# Patient Record
Sex: FEMALE | Race: OTHER | HISPANIC OR LATINO | Employment: FULL TIME | URBAN - METROPOLITAN AREA
[De-identification: names, ages, dates, MRNs, and addresses within clinical notes are randomized per-mention and may not be internally consistent; named-entity substitution may affect disease eponyms.]

---

## 2018-03-23 ENCOUNTER — OFFICE VISIT (OUTPATIENT)
Dept: OBGYN CLINIC | Facility: CLINIC | Age: 38
End: 2018-03-23
Payer: COMMERCIAL

## 2018-03-23 VITALS
HEIGHT: 65 IN | BODY MASS INDEX: 27.66 KG/M2 | WEIGHT: 166 LBS | SYSTOLIC BLOOD PRESSURE: 128 MMHG | DIASTOLIC BLOOD PRESSURE: 84 MMHG

## 2018-03-23 DIAGNOSIS — E04.9 ENLARGED THYROID: ICD-10-CM

## 2018-03-23 DIAGNOSIS — Z01.419 PAPANICOLAOU SMEAR, AS PART OF ROUTINE GYNECOLOGICAL EXAMINATION: Primary | ICD-10-CM

## 2018-03-23 DIAGNOSIS — Z11.3 SCREENING EXAMINATION FOR VENEREAL DISEASE: ICD-10-CM

## 2018-03-23 PROCEDURE — S0610 ANNUAL GYNECOLOGICAL EXAMINA: HCPCS | Performed by: PHYSICIAN ASSISTANT

## 2018-03-23 RX ORDER — IBUPROFEN 200 MG
TABLET ORAL EVERY 6 HOURS PRN
COMMUNITY
End: 2020-07-15

## 2018-03-23 NOTE — ASSESSMENT & PLAN NOTE
Pap guidelines reviewed  Pap & HPV done today  STD testing done today per patient request  Script for HIV, Hep B & C, RPR given  Return to office for annual or as needed

## 2018-03-23 NOTE — PROGRESS NOTES
Assessment/Plan   Enlarged thyroid  Reviewed with patient enlarged thyroid on exam today  Will have thyroid ultrasound and blood work done  Papanicolaou smear, as part of routine gynecological examination  Pap guidelines reviewed  Pap & HPV done today  STD testing done today per patient request  Script for HIV, Hep B & C, RPR given  Return to office for annual or as needed  Subjective:     Patient ID: Joaquina Hector is a 42 y o  y o  female  HPI  41 yo seen for annual exam   Hx of ablation a few years ago  Occ spotting since but nothing regular  Denies bowel or bladder issues  Patient currently has 2 partners, would like STD testing done  Denies abuse  Last pap: unknown, reports always normal    The following portions of the patient's history were reviewed and updated as appropriate:   She  has a past medical history of Lump of breast, right  She   Patient Active Problem List    Diagnosis Date Noted    Papanicolaou smear, as part of routine gynecological examination 2018    Enlarged thyroid 2018     She  has a past surgical history that includes Appendectomy; Breast lumpectomy (Right, ); Endometrial ablation (2008); and Tubal ligation ()  Her family history includes Diabetes in her father and mother; Hypertension in her brother, father, and mother; Prostate cancer in her father  She  reports that she has never smoked  She has never used smokeless tobacco  She reports that she drinks alcohol  She reports that she uses drugs, including Marijuana  Current Outpatient Prescriptions   Medication Sig Dispense Refill    ibuprofen (MOTRIN) 200 mg tablet Take by mouth every 6 (six) hours as needed for mild pain       No current facility-administered medications for this visit  She has No Known Allergies       Menstrual History:  OB History      Para Term  AB Living    5 3 3   2 3    SAB TAB Ectopic Multiple Live Births    3 1     1         Menarche age: 4 Review of Systems   Constitutional: Negative for fatigue, fever and unexpected weight change  HENT: Negative for dental problem and sinus pressure  Eyes: Negative for visual disturbance  Respiratory: Negative for cough, shortness of breath and wheezing  Cardiovascular: Negative for chest pain  Gastrointestinal: Negative for abdominal pain, blood in stool, constipation, diarrhea, nausea and vomiting  Endocrine: Negative for polydipsia  Genitourinary: Negative for difficulty urinating, dyspareunia, dysuria, frequency, hematuria, pelvic pain and urgency  Musculoskeletal: Negative for arthralgias and back pain  Neurological: Negative for dizziness, seizures, light-headedness and headaches  Psychiatric/Behavioral: Negative for suicidal ideas  The patient is not nervous/anxious  Objective:     Physical Exam   Constitutional: She is oriented to person, place, and time  She appears well-developed and well-nourished  Genitourinary: Vagina normal and uterus normal  There is no rash, tenderness, lesion, injury or Bartholin's cyst on the right labia  There is no rash, tenderness, lesion, injury or Bartholin's cyst on the left labia  Vagina exhibits no lesion  No erythema, tenderness or bleeding in the vagina  No signs of injury around the vagina  No vaginal discharge found  Right adnexum does not display mass, does not display tenderness and does not display fullness  Left adnexum does not display mass, does not display tenderness and does not display fullness  Cervix does not exhibit motion tenderness, lesion or discharge  Uterus is not enlarged, tender, exhibiting a mass, irregular (is regular) or mobile  HENT:   Head: Normocephalic and atraumatic  Neck: Thyromegaly (diffuse) present  Cardiovascular: Normal rate, regular rhythm and normal heart sounds  Exam reveals no gallop and no friction rub  No murmur heard    Pulmonary/Chest: Effort normal and breath sounds normal  No respiratory distress  She has no wheezes  Right breast exhibits no inverted nipple, no mass, no nipple discharge, no skin change and no tenderness  Left breast exhibits no inverted nipple, no mass, no nipple discharge, no skin change and no tenderness  Breasts are symmetrical  There is no breast swelling  Abdominal: Soft  She exhibits no distension and no mass  There is no tenderness  There is no rebound and no guarding  No hernia  Lymphadenopathy:     She has no cervical adenopathy  Right: No inguinal adenopathy present  Left: No inguinal adenopathy present  Neurological: She is alert and oriented to person, place, and time  Skin: Skin is warm and dry  Psychiatric: She has a normal mood and affect   Her behavior is normal

## 2018-03-23 NOTE — ASSESSMENT & PLAN NOTE
Reviewed with patient enlarged thyroid on exam today  Will have thyroid ultrasound and blood work done

## 2018-03-28 LAB
DEPRECATED C TRACH RRNA XXX QL PRB: NOT DETECTED
HPV HR 12 DNA CVX QL NAA+PROBE: NOT DETECTED
HPV16 DNA SPEC QL NAA+PROBE: DETECTED
HPV18 DNA SPEC QL NAA+PROBE: NOT DETECTED
N GONORRHOEA DNA UR QL NAA+PROBE: NOT DETECTED
THIN PREP CVX: ABNORMAL

## 2018-03-29 DIAGNOSIS — N76.0 BACTERIAL VAGINOSIS: Primary | ICD-10-CM

## 2018-03-29 DIAGNOSIS — B96.89 BACTERIAL VAGINOSIS: Primary | ICD-10-CM

## 2018-03-29 RX ORDER — METRONIDAZOLE 500 MG/1
500 TABLET ORAL EVERY 12 HOURS SCHEDULED
Qty: 14 TABLET | Refills: 0 | Status: SHIPPED | OUTPATIENT
Start: 2018-03-29 | End: 2018-04-05

## 2018-03-30 LAB
HBV SURFACE AG SERPL QL IA: NORMAL
HCV AB S/CO SERPL IA: 0.01
HCV AB SERPL QL IA: NORMAL
HIV 1+2 AB+HIV1 P24 AG SERPL QL IA: NORMAL
RPR SER QL: NORMAL
T4 FREE SERPL-MCNC: 1.2 NG/DL (ref 0.8–1.8)
TSH SERPL-ACNC: 0.8 MIU/L

## 2018-04-13 ENCOUNTER — PROCEDURE VISIT (OUTPATIENT)
Dept: OBGYN CLINIC | Facility: CLINIC | Age: 38
End: 2018-04-13
Payer: COMMERCIAL

## 2018-04-13 VITALS
WEIGHT: 163 LBS | HEIGHT: 66 IN | DIASTOLIC BLOOD PRESSURE: 82 MMHG | SYSTOLIC BLOOD PRESSURE: 130 MMHG | BODY MASS INDEX: 26.2 KG/M2

## 2018-04-13 DIAGNOSIS — R87.810 ASCUS WITH POSITIVE HIGH RISK HPV CERVICAL: Primary | ICD-10-CM

## 2018-04-13 DIAGNOSIS — R87.610 ASCUS WITH POSITIVE HIGH RISK HPV CERVICAL: Primary | ICD-10-CM

## 2018-04-13 PROCEDURE — 57456 ENDOCERV CURETTAGE W/SCOPE: CPT | Performed by: PHYSICIAN ASSISTANT

## 2018-04-15 PROBLEM — R87.810 ASCUS WITH POSITIVE HIGH RISK HPV CERVICAL: Status: ACTIVE | Noted: 2018-04-15

## 2018-04-15 PROBLEM — R87.610 ASCUS WITH POSITIVE HIGH RISK HPV CERVICAL: Status: ACTIVE | Noted: 2018-04-15

## 2018-04-15 NOTE — PROGRESS NOTES
Assessment/Plan:    ASCUS with positive high risk HPV cervical  No intercourse, tampon use, soaking in bath tub, or swimming for the next 5 days to avoid risk of infection  Call office with excessive bleeding, cramping, fever, or chills  Office will call with results and appropriate follow up  Problem List Items Addressed This Visit     ASCUS with positive high risk HPV cervical - Primary     No intercourse, tampon use, soaking in bath tub, or swimming for the next 5 days to avoid risk of infection  Call office with excessive bleeding, cramping, fever, or chills  Office will call with results and appropriate follow up  Relevant Orders    GP ENDO-CERVICAL BIOPSY            Subjective:      Patient ID: Allen Zheng is a 42 y o  female  HPI  43 yo seen for colposcopy  Pap smear on 3/23/2018 showed ASCUS (+)HRHPV 18  Denies bleeding in between menses or with intercourse  No family hx of GYN cancers  Occasional smoker  The following portions of the patient's history were reviewed and updated as appropriate:   She  has a past medical history of Lump of breast, right  She   Patient Active Problem List    Diagnosis Date Noted    ASCUS with positive high risk HPV cervical 04/15/2018    Papanicolaou smear, as part of routine gynecological examination 03/23/2018    Enlarged thyroid 03/23/2018     She  has a past surgical history that includes Appendectomy; Breast lumpectomy (Right, 1996); Endometrial ablation (08/2008); and Tubal ligation (2007)  Her family history includes Diabetes in her father and mother; Hypertension in her brother, father, and mother; Prostate cancer in her father  She  reports that she has never smoked  She has never used smokeless tobacco  She reports that she drinks alcohol  She reports that she uses drugs, including Marijuana    Current Outpatient Prescriptions   Medication Sig Dispense Refill    ibuprofen (MOTRIN) 200 mg tablet Take by mouth every 6 (six) hours as needed for mild pain       No current facility-administered medications for this visit  She has No Known Allergies       Review of Systems   Constitutional: Negative for fatigue, fever and unexpected weight change  HENT: Negative for dental problem and sinus pressure  Eyes: Negative for visual disturbance  Respiratory: Negative for cough, shortness of breath and wheezing  Cardiovascular: Negative for chest pain  Gastrointestinal: Negative for abdominal pain, blood in stool, constipation, diarrhea, nausea and vomiting  Endocrine: Negative for polydipsia  Genitourinary: Negative for difficulty urinating, dyspareunia, dysuria, frequency, hematuria, pelvic pain and urgency  Musculoskeletal: Negative for arthralgias and back pain  Neurological: Negative for dizziness, seizures, light-headedness and headaches  Psychiatric/Behavioral: Negative for suicidal ideas  The patient is not nervous/anxious  Objective:      /82 (BP Location: Left arm, Patient Position: Sitting, Cuff Size: Standard)   Ht 5' 6" (1 676 m)   Wt 73 9 kg (163 lb)   BMI 26 31 kg/m²          Physical Exam   Constitutional: She is oriented to person, place, and time  She appears well-developed and well-nourished  Genitourinary: There is no rash, tenderness, lesion or injury on the right labia  There is no rash, tenderness, lesion or injury on the left labia  Cervix exhibits no motion tenderness, no discharge and no friability  No erythema, tenderness or bleeding in the vagina  No foreign body in the vagina  No signs of injury around the vagina  No vaginal discharge found  Neurological: She is alert and oriented to person, place, and time  Skin: Skin is warm and dry  No rash noted  No erythema  No pallor       Colposcopy  Date/Time: 4/15/2018 1:43 PM  Performed by: Niecy Zhang  Authorized by: Niecy Zhang     Consent:     Consent obtained:  Verbal and written    Consent given by:  Patient Procedural risks discussed:  Bleeding    Patient questions answered: yes      Patient agrees, verbalizes understanding, and wants to proceed: yes      Educational handouts given: yes      Instructions and paperwork completed: yes    Universal protocol:     Patient states understanding of procedure being performed: yes    Pre-procedure:     Pre-procedure timeout performed: yes      Premeds:  Ibuprofen    Prepped with: acetic acid    Indication:     Indication:  ASC-US ((+)HPV 18)  Procedure:     Procedure: Colposcopy w/ endocervical curettage      Adel speculum was placed in the vagina: yes      Under colposcopic examination the transition zone was seen in entirety: yes      Endocervix was curetted using a Kevorkian curette: yes      Monsel's solution was applied: yes      Biopsy(s): yes      Location:  [facility]  Post-procedure:     Findings: Punctation      Patient tolerance of procedure:   Tolerated well, no immediate complications

## 2018-04-15 NOTE — ASSESSMENT & PLAN NOTE
No intercourse, tampon use, soaking in bath tub, or swimming for the next 5 days to avoid risk of infection  Call office with excessive bleeding, cramping, fever, or chills  Office will call with results and appropriate follow up

## 2018-04-16 LAB — BIOPSY SPEC-IMP: NORMAL

## 2018-09-21 ENCOUNTER — HOSPITAL ENCOUNTER (OUTPATIENT)
Dept: RADIOLOGY | Facility: HOSPITAL | Age: 38
Discharge: HOME | End: 2018-09-21
Payer: COMMERCIAL

## 2018-09-21 ENCOUNTER — TRANSCRIBE ORDERS (OUTPATIENT)
Dept: ADMINISTRATIVE | Facility: HOSPITAL | Age: 38
End: 2018-09-21

## 2018-09-21 DIAGNOSIS — R07.82 INTERCOSTAL PAIN: Primary | ICD-10-CM

## 2018-09-21 DIAGNOSIS — R07.82 INTERCOSTAL PAIN: ICD-10-CM

## 2018-09-21 PROCEDURE — 71101 X-RAY EXAM UNILAT RIBS/CHEST: CPT

## 2019-02-28 ENCOUNTER — TRANSCRIBE ORDERS (OUTPATIENT)
Dept: ADMINISTRATIVE | Facility: HOSPITAL | Age: 39
End: 2019-02-28

## 2019-02-28 DIAGNOSIS — Z12.39 SCREENING BREAST EXAMINATION: Primary | ICD-10-CM

## 2019-02-28 DIAGNOSIS — R01.1 UNDIAGNOSED CARDIAC MURMURS: ICD-10-CM

## 2019-03-06 ENCOUNTER — HOSPITAL ENCOUNTER (OUTPATIENT)
Dept: MAMMOGRAPHY | Facility: HOSPITAL | Age: 39
Discharge: HOME | End: 2019-03-06
Payer: COMMERCIAL

## 2019-03-06 VITALS — HEIGHT: 66 IN | BODY MASS INDEX: 26.2 KG/M2 | WEIGHT: 163 LBS

## 2019-03-06 DIAGNOSIS — Z12.39 SCREENING BREAST EXAMINATION: ICD-10-CM

## 2019-03-06 PROCEDURE — 77067 SCR MAMMO BI INCL CAD: CPT

## 2019-03-06 PROCEDURE — 77063 BREAST TOMOSYNTHESIS BI: CPT

## 2019-03-27 ENCOUNTER — ANNUAL EXAM (OUTPATIENT)
Dept: OBGYN CLINIC | Facility: CLINIC | Age: 39
End: 2019-03-27
Payer: COMMERCIAL

## 2019-03-27 VITALS
SYSTOLIC BLOOD PRESSURE: 128 MMHG | WEIGHT: 163 LBS | DIASTOLIC BLOOD PRESSURE: 82 MMHG | HEIGHT: 66 IN | BODY MASS INDEX: 26.2 KG/M2

## 2019-03-27 DIAGNOSIS — E04.9 ENLARGED THYROID: ICD-10-CM

## 2019-03-27 DIAGNOSIS — Z11.3 SCREENING EXAMINATION FOR VENEREAL DISEASE: ICD-10-CM

## 2019-03-27 DIAGNOSIS — Z01.419 GYNECOLOGIC EXAM NORMAL: Primary | ICD-10-CM

## 2019-03-27 PROCEDURE — S0612 ANNUAL GYNECOLOGICAL EXAMINA: HCPCS | Performed by: PHYSICIAN ASSISTANT

## 2019-03-27 RX ORDER — LISINOPRIL 10 MG/1
10 TABLET ORAL EVERY MORNING
COMMUNITY
Start: 2019-03-25 | End: 2021-09-01

## 2019-03-27 NOTE — PATIENT INSTRUCTIONS
Reviewed avoiding excessive salt and caffeine intake as well as trying Vitamin E, Evening Primrose oil, or Viiolet to help with pain

## 2019-03-27 NOTE — ASSESSMENT & PLAN NOTE
Pap guidelines reviewed  Pap with HPV done today  STD cultures done per patient request  STD blood work given  Reviewed patient normal breast exam today and mammogram in early March 2019  Reviewed avoiding excessive salt and caffeine intake as well as trying Vitamin E, Evening Primrose oil, or Viiolet to help with pain  Script for TFTs given for enlarged thyroid  Return to office for annual or as needed

## 2019-03-27 NOTE — PROGRESS NOTES
Assessment/Plan   Problem List Items Addressed This Visit        Endocrine    Enlarged thyroid    Relevant Orders    TSH, 3rd generation    T4, free       Other    Gynecologic exam normal - Primary     Pap guidelines reviewed  Pap with HPV done today  STD cultures done per patient request  STD blood work given  Reviewed patient normal breast exam today and mammogram in early March 2019  Reviewed avoiding excessive salt and caffeine intake as well as trying Vitamin E, Evening Primrose oil, or Viiolet to help with pain  Script for TFTs given for enlarged thyroid  Return to office for annual or as needed  Relevant Orders    GP Liquid-Based Pap + HPV Plus      Other Visit Diagnoses     Screening examination for venereal disease        Relevant Orders    GP Chlamydia + Gonorrhea, Liquid-Based    Hepatitis B surface antigen    Hepatitis C antibody    HIV 1/2 AG-AB combo    RPR          Subjective:     Patient ID: Sahil White is a 34 y o  y o  female  HPI  35 yo seen for annual exam  Hx of uterine ablation, does not have much bleeding since, occ spotting  Denies bladder issues  Reports occasional constipation has started taking Metamucil daily  Patient reports having breast pain on left side, typically when she lays on that side at night  PCP suggested watching caffeine in diet and has not noticed huge difference  Does not notice that it is cyclical at all  Denies breast lumps, fever, chills, nipple discharge, or trauma to breast  Mammogram on 3/6/2019 BIRADS 1: Negative  Patient was shown to have enlarged thyroid on exam last year, did not have a chance to get the blood work done, needs another script  Reports PCP heard new heart murmur at last exam, going for ECHO  Last pap: 3/23/2019 ASCUS (+)HRHPV non 16, 18  Colpo: 4/2018 benign [facility]     The following portions of the patient's history were reviewed and updated as appropriate:   She  has a past medical history of Hypertension (11/16/2018) and Lump of breast, right  She   Patient Active Problem List    Diagnosis Date Noted    Gynecologic exam normal 2019    ASCUS with positive high risk HPV cervical 04/15/2018    Papanicolaou smear, as part of routine gynecological examination 2018    Enlarged thyroid 2018     She  has a past surgical history that includes Appendectomy; Breast lumpectomy (Right, ); Endometrial ablation (2008); Tubal ligation (); and Breast biopsy (Right, )  Her family history includes Cancer (age of onset: 76) in her paternal aunt; Colon cancer in her father; Diabetes in her father and mother; Hypertension in her brother, father, and mother; Prostate cancer (age of onset: 79) in her father  She  reports that she has never smoked  She has never used smokeless tobacco  She reports that she drinks alcohol  She reports that she has current or past drug history  Drug: Marijuana  Current Outpatient Medications   Medication Sig Dispense Refill    ibuprofen (MOTRIN) 200 mg tablet Take by mouth every 6 (six) hours as needed for mild pain      lisinopril (ZESTRIL) 10 mg tablet        No current facility-administered medications for this visit  She has No Known Allergies       Menstrual History:  OB History        5    Para   3    Term   3            AB   2    Living   3       SAB   1    TAB   1    Ectopic        Multiple        Live Births   3                Menarche age: 4  No LMP recorded  Patient has had an ablation  Review of Systems   Constitutional: Negative for fatigue, fever and unexpected weight change  HENT: Negative for dental problem and sinus pressure  Eyes: Negative for visual disturbance  Respiratory: Negative for cough, shortness of breath and wheezing  Cardiovascular: Negative for chest pain  Gastrointestinal: Negative for abdominal pain, blood in stool, constipation, diarrhea, nausea and vomiting  Endocrine: Negative for polydipsia  Genitourinary: Negative for difficulty urinating, dyspareunia, dysuria, frequency, hematuria, pelvic pain and urgency  Musculoskeletal: Negative for arthralgias and back pain  Neurological: Negative for dizziness, seizures, light-headedness and headaches  Psychiatric/Behavioral: Negative for suicidal ideas  The patient is not nervous/anxious  Objective:  Vitals:    03/27/19 0803   BP: 128/82   BP Location: Left arm   Patient Position: Sitting   Cuff Size: Standard   Weight: 73 9 kg (163 lb)   Height: 5' 6" (1 676 m)      Physical Exam   Constitutional: She is oriented to person, place, and time  She appears well-developed and well-nourished  Genitourinary: Rectum normal, vagina normal and uterus normal  There is no rash, tenderness, lesion, injury or Bartholin's cyst on the right labia  There is no rash, tenderness, lesion, injury or Bartholin's cyst on the left labia  Vagina exhibits no lesion  No erythema, tenderness or bleeding in the vagina  No signs of injury around the vagina  No vaginal discharge found  Right adnexum does not display mass, does not display tenderness and does not display fullness  Left adnexum does not display mass, does not display tenderness and does not display fullness  Cervix does not exhibit motion tenderness, lesion or discharge  Uterus is not enlarged, tender, exhibiting a mass, irregular (is regular) or mobile  Rectal exam shows no external hemorrhoid, no internal hemorrhoid, no fissure, no mass, no tenderness, anal tone normal and guaiac negative stool  HENT:   Head: Normocephalic and atraumatic  Neck: Thyromegaly (diffuse) present  Cardiovascular: Normal rate, regular rhythm and normal heart sounds  Exam reveals no gallop and no friction rub  No murmur heard  Pulmonary/Chest: Effort normal and breath sounds normal  No respiratory distress  She has no wheezes   Right breast exhibits no inverted nipple, no mass, no nipple discharge, no skin change and no tenderness  Left breast exhibits no inverted nipple, no mass, no nipple discharge, no skin change and no tenderness  No breast swelling, tenderness, discharge or bleeding  Breasts are symmetrical    Abdominal: Soft  She exhibits no distension and no mass  There is no tenderness  There is no rebound and no guarding  No hernia  Lymphadenopathy:     She has no cervical adenopathy  Right: No inguinal adenopathy present  Left: No inguinal adenopathy present  Neurological: She is alert and oriented to person, place, and time  Skin: Skin is warm and dry  Psychiatric: She has a normal mood and affect   Her behavior is normal

## 2019-03-28 ENCOUNTER — HOSPITAL ENCOUNTER (OUTPATIENT)
Dept: NON INVASIVE DIAGNOSTICS | Facility: CLINIC | Age: 39
Discharge: HOME | End: 2019-03-28
Payer: COMMERCIAL

## 2019-03-28 DIAGNOSIS — R01.1 UNDIAGNOSED CARDIAC MURMURS: ICD-10-CM

## 2019-03-28 PROCEDURE — 93306 TTE W/DOPPLER COMPLETE: CPT

## 2019-03-28 PROCEDURE — 93306 TTE W/DOPPLER COMPLETE: CPT | Performed by: INTERNAL MEDICINE

## 2019-04-01 LAB
HBV SURFACE AG SERPL QL IA: NORMAL
HCV AB S/CO SERPL IA: 0.03
HCV AB SERPL QL IA: NORMAL
HIV 1+2 AB+HIV1 P24 AG SERPL QL IA: NORMAL
RPR SER QL: NORMAL
T4 FREE SERPL-MCNC: 1.1 NG/DL (ref 0.8–1.8)
TSH SERPL-ACNC: 0.49 MIU/L

## 2019-04-02 ENCOUNTER — TELEPHONE (OUTPATIENT)
Dept: OBGYN CLINIC | Facility: CLINIC | Age: 39
End: 2019-04-02

## 2019-04-04 DIAGNOSIS — N76.0 BACTERIAL VAGINOSIS: Primary | ICD-10-CM

## 2019-04-04 DIAGNOSIS — B96.89 BACTERIAL VAGINOSIS: Primary | ICD-10-CM

## 2019-04-04 RX ORDER — METRONIDAZOLE 500 MG/1
500 TABLET ORAL EVERY 8 HOURS SCHEDULED
Qty: 21 TABLET | Refills: 0 | Status: SHIPPED | OUTPATIENT
Start: 2019-04-04 | End: 2019-04-11

## 2019-04-16 ENCOUNTER — TELEPHONE (OUTPATIENT)
Dept: OBGYN CLINIC | Facility: CLINIC | Age: 39
End: 2019-04-16

## 2019-05-03 ENCOUNTER — PROCEDURE VISIT (OUTPATIENT)
Dept: OBGYN CLINIC | Facility: CLINIC | Age: 39
End: 2019-05-03
Payer: COMMERCIAL

## 2019-05-03 VITALS
WEIGHT: 162 LBS | HEIGHT: 66 IN | SYSTOLIC BLOOD PRESSURE: 120 MMHG | DIASTOLIC BLOOD PRESSURE: 80 MMHG | BODY MASS INDEX: 26.03 KG/M2

## 2019-05-03 DIAGNOSIS — R87.810 ASCUS WITH POSITIVE HIGH RISK HPV CERVICAL: Primary | ICD-10-CM

## 2019-05-03 DIAGNOSIS — R87.610 ASCUS WITH POSITIVE HIGH RISK HPV CERVICAL: Primary | ICD-10-CM

## 2019-05-03 PROCEDURE — 57454 BX/CURETT OF CERVIX W/SCOPE: CPT | Performed by: PHYSICIAN ASSISTANT

## 2019-05-07 LAB
BIOPSY SPEC-IMP: NORMAL
OTHER STN CVX: ABNORMAL

## 2019-05-08 LAB
BIOPSY SPEC-IMP: NORMAL
BIOPSY SPEC-IMP: NORMAL

## 2020-06-15 ENCOUNTER — TRANSCRIBE ORDERS (OUTPATIENT)
Dept: ADMINISTRATIVE | Facility: HOSPITAL | Age: 40
End: 2020-06-15

## 2020-06-15 DIAGNOSIS — Z12.31 SCREENING MAMMOGRAM FOR HIGH-RISK PATIENT: Primary | ICD-10-CM

## 2020-06-15 DIAGNOSIS — L72.0 EPIDERMAL CYST: Primary | ICD-10-CM

## 2020-06-17 ENCOUNTER — HOSPITAL ENCOUNTER (OUTPATIENT)
Dept: MAMMOGRAPHY | Facility: HOSPITAL | Age: 40
Discharge: HOME | End: 2020-06-17
Payer: COMMERCIAL

## 2020-06-17 VITALS — WEIGHT: 162 LBS | HEIGHT: 66 IN | BODY MASS INDEX: 26.03 KG/M2

## 2020-06-17 DIAGNOSIS — Z12.31 SCREENING MAMMOGRAM FOR HIGH-RISK PATIENT: ICD-10-CM

## 2020-06-17 PROCEDURE — 77067 SCR MAMMO BI INCL CAD: CPT

## 2020-06-17 PROCEDURE — 77063 BREAST TOMOSYNTHESIS BI: CPT

## 2020-06-19 ENCOUNTER — HOSPITAL ENCOUNTER (OUTPATIENT)
Dept: ULTRASOUND IMAGING | Facility: HOSPITAL | Age: 40
Discharge: HOME | End: 2020-06-19
Payer: COMMERCIAL

## 2020-06-19 DIAGNOSIS — L72.0 EPIDERMAL CYST: ICD-10-CM

## 2020-06-19 PROCEDURE — 76536 US EXAM OF HEAD AND NECK: CPT

## 2020-07-01 ENCOUNTER — ANNUAL EXAM (OUTPATIENT)
Dept: OBGYN CLINIC | Facility: CLINIC | Age: 40
End: 2020-07-01
Payer: COMMERCIAL

## 2020-07-01 VITALS
SYSTOLIC BLOOD PRESSURE: 118 MMHG | BODY MASS INDEX: 26.49 KG/M2 | HEIGHT: 65 IN | DIASTOLIC BLOOD PRESSURE: 78 MMHG | TEMPERATURE: 96.6 F | WEIGHT: 159 LBS

## 2020-07-01 DIAGNOSIS — Z12.31 ENCOUNTER FOR SCREENING MAMMOGRAM FOR MALIGNANT NEOPLASM OF BREAST: ICD-10-CM

## 2020-07-01 DIAGNOSIS — Z11.3 SCREENING EXAMINATION FOR VENEREAL DISEASE: ICD-10-CM

## 2020-07-01 DIAGNOSIS — Z01.419 ROUTINE GYNECOLOGICAL EXAMINATION: Primary | ICD-10-CM

## 2020-07-01 PROCEDURE — 87491 CHLMYD TRACH DNA AMP PROBE: CPT | Performed by: PHYSICIAN ASSISTANT

## 2020-07-01 PROCEDURE — 87624 HPV HI-RISK TYP POOLED RSLT: CPT | Performed by: PHYSICIAN ASSISTANT

## 2020-07-01 PROCEDURE — G0145 SCR C/V CYTO,THINLAYER,RESCR: HCPCS | Performed by: PHYSICIAN ASSISTANT

## 2020-07-01 PROCEDURE — 87591 N.GONORRHOEAE DNA AMP PROB: CPT | Performed by: PHYSICIAN ASSISTANT

## 2020-07-01 PROCEDURE — S0612 ANNUAL GYNECOLOGICAL EXAMINA: HCPCS | Performed by: PHYSICIAN ASSISTANT

## 2020-07-01 RX ORDER — YOHIMBE BARK 500 MG
CAPSULE ORAL EVERY MORNING
COMMUNITY

## 2020-07-01 NOTE — ASSESSMENT & PLAN NOTE
Pap guidelines reviewed  Pap with HPV done today  STD cultures done today  Script for mammogram given  Return to office for annual or as needed

## 2020-07-01 NOTE — PROGRESS NOTES
Assessment/Plan   Problem List Items Addressed This Visit        Other    Routine gynecological examination - Primary     Pap guidelines reviewed  Pap with HPV done today  STD cultures done today  Script for mammogram given  Return to office for annual or as needed  Relevant Orders    Liquid-based pap, screening    Chlamydia/GC amplified DNA by PCR      Other Visit Diagnoses     Encounter for screening mammogram for malignant neoplasm of breast        Relevant Orders    Mammo screening bilateral w 3d & cad    Screening examination for venereal disease        Relevant Orders    Chlamydia/GC amplified DNA by PCR          Subjective:     Patient ID: Kitty Blas is a 37 y o  y o  female  HPI  36 yo seen for annual exam  Hx of endometrial ablation in 2008  LMP 6 months ago, 1 day of spotting at that time  Denies bowel or bladder issues  Last pap: 3/27/2019 ASCUS (+)HPV 16  Colpo 5/2019 [facility] benign  Last mammogram: 6/17/2020 BIRADS 1: negative  The following portions of the patient's history were reviewed and updated as appropriate:   She  has a past medical history of Hypertension (11/16/2018), Lump of breast, right, and Lupus ([facility] [address] )  She   Patient Active Problem List    Diagnosis Date Noted    Routine gynecological examination 07/01/2020    Gynecologic exam normal 03/27/2019    ASCUS with positive high risk HPV cervical 04/15/2018    Papanicolaou smear, as part of routine gynecological examination 03/23/2018    Enlarged thyroid 03/23/2018     She  has a past surgical history that includes Appendectomy; Breast lumpectomy (Right, 1996); Endometrial ablation (08/2008); Tubal ligation (2007); and Breast biopsy (Right, 1995)  Her family history includes Cancer (age of onset: 76) in her paternal aunt;  Colon cancer in her father; Diabetes in her father and mother; Hypertension in her brother, father, and mother; No Known Problems in her brother, daughter, daughter, sister, sister, sister, sister, sister, and son; Prostate cancer (age of onset: 79) in her father  She  reports that she has never smoked  She has never used smokeless tobacco  She reports that she drinks alcohol  She reports that she has current or past drug history  Drug: Marijuana  Current Outpatient Medications   Medication Sig Dispense Refill    ibuprofen (MOTRIN) 200 mg tablet Take by mouth every 6 (six) hours as needed for mild pain      Lactobacillus (ACIDOPHILUS) 100 MG CAPS Take by mouth      lisinopril (ZESTRIL) 10 mg tablet       vitamin E, tocopherol, 200 units capsule Take 200 Units by mouth daily       No current facility-administered medications for this visit  She has No Known Allergies       Menstrual History:  OB History        5    Para   3    Term   3            AB   2    Living   3       SAB   1    TAB   1    Ectopic        Multiple        Live Births   3                  No LMP recorded  Patient has had an ablation  Review of Systems   Constitutional: Negative for fatigue, fever and unexpected weight change  HENT: Negative for dental problem and sinus pressure  Eyes: Negative for visual disturbance  Respiratory: Negative for cough, shortness of breath and wheezing  Cardiovascular: Negative for chest pain  Gastrointestinal: Negative for abdominal pain, blood in stool, constipation, diarrhea, nausea and vomiting  Endocrine: Negative for polydipsia  Genitourinary: Negative for difficulty urinating, dyspareunia, dysuria, frequency, hematuria, pelvic pain and urgency  Musculoskeletal: Negative for arthralgias and back pain  Neurological: Negative for dizziness, seizures, light-headedness and headaches  Psychiatric/Behavioral: Negative for suicidal ideas  The patient is not nervous/anxious          Objective:  Vitals:    20 1133   BP: 118/78   BP Location: Left arm   Patient Position: Sitting   Cuff Size: Standard   Temp: (!) 96 6 °F (35 9 °C)   Weight: 72 1 kg (159 lb)   Height: 5' 5" (1 651 m)      Physical Exam   Constitutional: She is oriented to person, place, and time  She appears well-developed and well-nourished  Genitourinary: Rectum normal, vagina normal and uterus normal  Pelvic exam was performed with patient supine  There is no rash, tenderness, lesion, injury or Bartholin's cyst on the right labia  There is no rash, tenderness, lesion, injury or Bartholin's cyst on the left labia  Vagina exhibits no lesion  No erythema, tenderness or bleeding in the vagina  No signs of injury around the vagina  No vaginal discharge found  Right adnexum does not display mass, does not display tenderness and does not display fullness  Left adnexum does not display mass, does not display tenderness and does not display fullness  Cervix does not exhibit motion tenderness, lesion or discharge  Uterus is not enlarged, tender, exhibiting a mass, irregular (is regular) or mobile  Rectal exam shows no external hemorrhoid, no internal hemorrhoid, no fissure, no mass, no tenderness, anal tone normal and guaiac negative stool  HENT:   Head: Normocephalic and atraumatic  Neck: No thyromegaly present  Cardiovascular: Normal rate, regular rhythm and normal heart sounds  Exam reveals no gallop and no friction rub  No murmur heard  Pulmonary/Chest: Effort normal and breath sounds normal  No respiratory distress  She has no wheezes  Right breast exhibits no inverted nipple, no mass, no nipple discharge, no skin change and no tenderness  Left breast exhibits no inverted nipple, no mass, no nipple discharge, no skin change and no tenderness  No breast swelling, tenderness, discharge or bleeding  Breasts are symmetrical    Abdominal: Soft  She exhibits no distension and no mass  There is no tenderness  There is no rebound and no guarding  No hernia  Lymphadenopathy:     She has no cervical adenopathy  Right: No inguinal adenopathy present          Left: No inguinal adenopathy present  Neurological: She is alert and oriented to person, place, and time  Skin: Skin is warm and dry  Psychiatric: She has a normal mood and affect   Her behavior is normal

## 2020-07-02 LAB
C TRACH DNA SPEC QL NAA+PROBE: NEGATIVE
N GONORRHOEA DNA SPEC QL NAA+PROBE: NEGATIVE

## 2020-07-03 LAB
HPV HR 12 DNA CVX QL NAA+PROBE: NEGATIVE
HPV16 DNA CVX QL NAA+PROBE: NEGATIVE
HPV18 DNA CVX QL NAA+PROBE: NEGATIVE

## 2020-07-08 LAB
LAB AP GYN PRIMARY INTERPRETATION: NORMAL
LAB AP LMP: NORMAL
Lab: NORMAL
PATH INTERP SPEC-IMP: NORMAL

## 2020-07-10 ENCOUNTER — OFFICE VISIT (OUTPATIENT)
Dept: SURGERY | Facility: CLINIC | Age: 40
End: 2020-07-10
Payer: COMMERCIAL

## 2020-07-10 VITALS
HEIGHT: 65 IN | TEMPERATURE: 96.8 F | HEART RATE: 51 BPM | SYSTOLIC BLOOD PRESSURE: 112 MMHG | BODY MASS INDEX: 26.49 KG/M2 | DIASTOLIC BLOOD PRESSURE: 60 MMHG | RESPIRATION RATE: 16 BRPM | WEIGHT: 159 LBS

## 2020-07-10 DIAGNOSIS — R22.0 MASS OF SCALP: ICD-10-CM

## 2020-07-10 DIAGNOSIS — R22.0 MASS OF SCALP: Primary | ICD-10-CM

## 2020-07-10 PROCEDURE — U0003 INFECTIOUS AGENT DETECTION BY NUCLEIC ACID (DNA OR RNA); SEVERE ACUTE RESPIRATORY SYNDROME CORONAVIRUS 2 (SARS-COV-2) (CORONAVIRUS DISEASE [COVID-19]), AMPLIFIED PROBE TECHNIQUE, MAKING USE OF HIGH THROUGHPUT TECHNOLOGIES AS DESCRIBED BY CMS-2020-01-R: HCPCS

## 2020-07-10 PROCEDURE — 99242 OFF/OP CONSLTJ NEW/EST SF 20: CPT | Performed by: SURGERY

## 2020-07-10 RX ORDER — ACETAMINOPHEN 500 MG
500 TABLET ORAL EVERY 6 HOURS PRN
COMMUNITY

## 2020-07-10 NOTE — PATIENT INSTRUCTIONS
Excision is the only treatment available here    This has been explained to her and scheduled for next week

## 2020-07-10 NOTE — PROGRESS NOTES
Assessment/Plan:    No problem-specific Assessment & Plan notes found for this encounter  There are no diagnoses linked to this encounter  Subjective:      Patient ID: Tanner Bui is a 37 y o  female  The patient is a 49-year-old black female with a six-month history of a lesion on the top/back of her scalp  This was worked up with a ultrasound which showed a 7 mm nodule  They thought it was most likely a lymph node      The following portions of the patient's history were reviewed and updated as appropriate: allergies, current medications, past family history, past medical history, past social history, past surgical history and problem list     Review of Systems   Constitutional: Negative  HENT: Negative  Hearing loss: minimal     Eyes: Visual disturbance: 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  Respiratory: Positive for shortness of breath  Cardiovascular:        Hypertension  Irregular heart beat, was worked up   Gastrointestinal: Negative  Endocrine: Negative  Genitourinary: Negative  Musculoskeletal: Positive for myalgias  Neck pain: positive for lupus  Lupus   Skin: Negative  Neurological: Positive for headaches  Psychiatric/Behavioral:        Anxiety         Objective:      /60 (BP Location: Left arm, Patient Position: Sitting, Cuff Size: Adult)   Pulse (!) 51   Temp (!) 96 8 °F (36 °C) (Tympanic)   Resp 16   Ht 5' 5" (1 651 m)   Wt 72 1 kg (159 lb)   BMI 26 46 kg/m²          Physical Exam   Constitutional: She is oriented to person, place, and time  She appears well-developed  HENT:   Head: Normocephalic  Right Ear: External ear normal    Left Ear: External ear normal    Eyes:   She has a stellate scar in her conjunctiva on the left from an old injury   Neck: Normal range of motion  No thyromegaly present  Cardiovascular: Normal rate, regular rhythm and normal heart sounds  Pulmonary/Chest: Breath sounds normal  No stridor   No respiratory distress  She has no wheezes  She has no rales  Musculoskeletal: Normal range of motion  Lymphadenopathy:     She has no cervical adenopathy  Neurological: She is alert and oriented to person, place, and time  Skin:   She has a slightly less than 1 cm lesion approximately the crown of her scalp  This is nontender and nonfluctuant  I think it is attached to skin although it is possible it is not

## 2020-07-10 NOTE — H&P
Assessment/Plan:    No problem-specific Assessment & Plan notes found for this encounter  There are no diagnoses linked to this encounter  Subjective:      Patient ID: Rosa Maria Beckford is a 37 y o  female  The patient is a 24-year-old black female with a six-month history of a lesion on the top/back of her scalp  This was worked up with a ultrasound which showed a 7 mm nodule  They thought it was most likely a lymph node      The following portions of the patient's history were reviewed and updated as appropriate: allergies, current medications, past family history, past medical history, past social history, past surgical history and problem list     Review of Systems   Constitutional: Negative  HENT: Negative  Hearing loss: minimal     Eyes: Visual disturbance: 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  Respiratory: Positive for shortness of breath  Cardiovascular:        Hypertension  Irregular heart beat, was worked up   Gastrointestinal: Negative  Endocrine: Negative  Genitourinary: Negative  Musculoskeletal: Positive for myalgias  Neck pain: positive for lupus  Lupus   Skin: Negative  Neurological: Positive for headaches  Psychiatric/Behavioral:        Anxiety         Objective:      /60 (BP Location: Left arm, Patient Position: Sitting, Cuff Size: Adult)   Pulse (!) 51   Temp (!) 96 8 °F (36 °C) (Tympanic)   Resp 16   Ht 5' 5" (1 651 m)   Wt 72 1 kg (159 lb)   BMI 26 46 kg/m²           Physical Exam   Constitutional: She is oriented to person, place, and time  She appears well-developed  HENT:   Head: Normocephalic  Right Ear: External ear normal    Left Ear: External ear normal    Eyes:   She has a stellate scar in her conjunctiva on the left from an old injury   Neck: Normal range of motion  No thyromegaly present  Cardiovascular: Normal rate, regular rhythm and normal heart sounds  Pulmonary/Chest: Breath sounds normal  No stridor   No respiratory distress  She has no wheezes  She has no rales  Musculoskeletal: Normal range of motion  Lymphadenopathy:     She has no cervical adenopathy  Neurological: She is alert and oriented to person, place, and time  Skin:   She has a slightly less than 1 cm lesion approximately the crown of her scalp  This is nontender and nonfluctuant  I think it is attached to skin although it is possible it is not

## 2020-07-11 LAB
INPATIENT: NORMAL
SARS-COV-2 RNA SPEC QL NAA+PROBE: NOT DETECTED

## 2020-07-15 NOTE — PRE-PROCEDURE INSTRUCTIONS
Pre-Surgery Instructions:   Medication Instructions    acetaminophen (TYLENOL) 500 mg tablet Instructed patient per Anesthesia Guidelines   Lactobacillus (ACIDOPHILUS) 100 MG CAPS Instructed patient per Anesthesia Guidelines   lisinopril (ZESTRIL) 10 mg tablet Instructed patient per Anesthesia Guidelines   NON FORMULARY Instructed patient per Anesthesia Guidelines   Sennosides (SENOKOT PO) Instructed patient per Anesthesia Guidelines   VITAMIN E PO Instructed patient per Anesthesia Guidelines  Pre op instructions given  Pt to take lisinopril the am of the procedure   araceli Quintero [phone] Surgical Experience    The following information was developed to assist you to prepare for your operation  What do I need to do before coming to the hospital?   Arrange for a responsible person to drive you to and from the hospital    Arrange care for your children at home  Children are not allowed in the recovery areas of the hospital   Plan to wear clothing that is easy to put on and take off  If you are having shoulder surgery, wear a shirt that buttons or zippers in the front  Bathing  o Shower the evening before and the morning of your surgery with an antibacterial soap  Please refer to the Pre Op Showering Instructions for Surgery Patients Sheet   o Remove nail polish and all body piercing jewelry  o Do not shave any body part for at least 24 hours before surgery-this includes face, arms, legs and upper body  Food  o Nothing to eat or drink after midnight the night before your surgery   This includes candy and chewing gum  o Exception: If your surgery is after 12:00pm (noon), you may have clear liquids such as 7-Up®, ginger ale, apple or cranberry juice, Jell-O®, water, or clear broth until 8:00 am  o Do not drink milk or juice with pulp on the morning before surgery  o Do not drink alcohol 24 hours before surgery  Medicine  o Follow instructions you received from your surgeon about which medicines you may take on the day of surgery  o If instructed to take medicine on the morning of surgery, take pills with just a small sip of water  Call your prescribing doctor for specific infroamtion on what to do if you take insulin    What should I bring to the hospital?    Bring:  Paige Barthel or a walker, if you have them, for foot or knee surgery   A list of the daily medicines, vitamins, minerals, herbals and nutritional supplements you take  Include the dosages of medicines and the time you take them each day   Glasses, dentures or hearing aids   Minimal clothing; you will be wearing hospital sleepwear   Photo ID; required to verify your identity   If you have a Living Will or Power of , bring a copy of the documents   If you have an ostomy, bring an extra pouch and any supplies you use    Do not bring   Medicines or inhalers   Money, valuables or jewelry    What other information should I know about the day of surgery?  Notify your surgeons if you develop a cold, sore throat, cough, fever, rash or any other illness   Report to the Ambulatory Surgical/Same Day Surgery Unit   You will be instructed to stop at Registration only if you have not been pre-registered   Inform your  fi they do not stay that they will be asked by the staff to leave a phone number where they can be reached   Be available to be reached before surgery  In the event the operating room schedule changes, you may be asked to come in earlier or later than expected    *It is important to tell your doctor and others involved in your health care if you are taking or have been taking any non-prescription drugs, vitamins, minerals, herbals or other nutritional supplements   Any of these may interact with some food or medicines and cause a reaction

## 2020-07-16 ENCOUNTER — HOSPITAL ENCOUNTER (OUTPATIENT)
Facility: HOSPITAL | Age: 40
Setting detail: OUTPATIENT SURGERY
Discharge: HOME | End: 2020-07-16
Attending: SURGERY | Admitting: SURGERY
Payer: COMMERCIAL

## 2020-07-16 VITALS
BODY MASS INDEX: 25.7 KG/M2 | HEIGHT: 65 IN | WEIGHT: 154.25 LBS | SYSTOLIC BLOOD PRESSURE: 128 MMHG | HEART RATE: 83 BPM | TEMPERATURE: 99.4 F | RESPIRATION RATE: 16 BRPM | DIASTOLIC BLOOD PRESSURE: 75 MMHG | OXYGEN SATURATION: 100 %

## 2020-07-16 DIAGNOSIS — R22.0 MASS OF SCALP: ICD-10-CM

## 2020-07-16 PROCEDURE — 12031 INTMD RPR S/A/T/EXT 2.5 CM/<: CPT | Performed by: SURGERY

## 2020-07-16 PROCEDURE — 88304 TISSUE EXAM BY PATHOLOGIST: CPT | Performed by: PATHOLOGY

## 2020-07-16 PROCEDURE — 11422 EXC H-F-NK-SP B9+MARG 1.1-2: CPT | Performed by: SURGERY

## 2020-07-16 RX ORDER — MAGNESIUM HYDROXIDE 1200 MG/15ML
LIQUID ORAL AS NEEDED
Status: DISCONTINUED | OUTPATIENT
Start: 2020-07-16 | End: 2020-07-16 | Stop reason: HOSPADM

## 2020-07-16 RX ORDER — BACITRACIN, NEOMYCIN, POLYMYXIN B 400; 3.5; 5 [USP'U]/G; MG/G; [USP'U]/G
OINTMENT TOPICAL AS NEEDED
Status: DISCONTINUED | OUTPATIENT
Start: 2020-07-16 | End: 2020-07-16 | Stop reason: HOSPADM

## 2020-07-16 NOTE — OP NOTE
OPERATIVE REPORT  PATIENT NAME: Valentine Hurd    :  1980  MRN: [id number]  Pt Location: [facility] OR ROOM 02    SURGERY DATE: 2020    Surgeon(s) and Role:     * Edgar Brumfield MD - Primary    Preop Diagnosis:  Mass of scalp [R22 0]    Post-Op Diagnosis Codes:     * Mass of scalp [R22 0]    Procedure(s) (LRB):  EXCISION LESION/MASS SCALP (N/A)    Specimen(s):  ID Type Source Tests Collected by Time Destination   1 : Posterior Scalp Cyst Tissue Cyst TISSUE EXAM Edgar Brumfield MD 2020 [id number]        Estimated Blood Loss:   Minimal    Drains:  * No LDAs found *    Anesthesia Type:   Local    Operative Indications: Mass of scalp [R22 0]      Operative Findings:  Scalp cyst    Complications:   None    Procedure and Technique:  The patient was identified by May and placed in the prone position on the operating room table  The the patient had previously been marked and the hair shaved by me  The area was now prepped and remarked a time-out was done  1% neutralized lidocaine with epinephrine was infused as a local anesthetic  An elliptical skin incision, approximately 1 3 cm was now made  This was continued with knife Bovie and scissors  The cyst could be seen attached to the superior portion of the incision  The cyst was dissected free with a scissor and the base was fulgurated  Wounds were irrigated and then closed with interrupted Vicryl followed by interrupted 4 0 Prolene  Triple antibiotic was now placed over the wound  Due to the fact that there was no available resident, a [address] [place] [address] Road, Jemal Kurtz, acted as 1st assistant    She was necessary for retraction purposes   I was present for the entire procedure, A qualified resident physician was not available and A physician assistant was required during the procedure for retraction tissue handling,dissection and suturing    Patient Disposition:  [facility]    SIGNATURE: Edgar Brumfield MD  DATE: 2020  TIME: 9:52 AM

## 2020-07-16 NOTE — DISCHARGE INSTRUCTIONS
Patient may shower tomorrow  She can take Tylenol or Advil or Aleve for pain  She should use an ice pack chantelle Lauren   She should return in 1 week

## 2020-07-23 ENCOUNTER — TELEPHONE (OUTPATIENT)
Dept: PULMONOLOGY | Facility: CLINIC | Age: 40
End: 2020-07-23

## 2020-07-23 NOTE — TELEPHONE ENCOUNTER

## 2020-07-24 ENCOUNTER — OFFICE VISIT (OUTPATIENT)
Dept: SURGERY | Facility: CLINIC | Age: 40
End: 2020-07-24

## 2020-07-24 VITALS
BODY MASS INDEX: 26.06 KG/M2 | WEIGHT: 156.6 LBS | TEMPERATURE: 97.5 F | SYSTOLIC BLOOD PRESSURE: 118 MMHG | HEART RATE: 63 BPM | RESPIRATION RATE: 16 BRPM | DIASTOLIC BLOOD PRESSURE: 80 MMHG

## 2020-07-24 DIAGNOSIS — L72.11 PILAR CYST: Primary | ICD-10-CM

## 2020-07-24 PROCEDURE — 99024 POSTOP FOLLOW-UP VISIT: CPT | Performed by: SURGERY

## 2020-07-24 RX ORDER — ERGOCALCIFEROL (VITAMIN D2) 1250 MCG
50000 CAPSULE ORAL WEEKLY
COMMUNITY

## 2020-07-24 NOTE — PROGRESS NOTES
Assessment/Plan:  1st postop visit after removal of a pill are cyst from her posterior scalp the patient has had no problems     Diagnoses and all orders for this visit:    Pilar cyst    Other orders  -     ergocalciferol (ERGOCALCIFEROL) 1 25 MG (50164 UT) capsule; Take 50,000 Units by mouth once a week          Subjective: The wound is completely healed  Sutures are removed     Patient ID: Tanner Bui is a 37 y o  female      HPI    Review of Systems      Objective:     Physical Exam  the wound is completely healed and the sutures are removed

## 2020-07-24 NOTE — PATIENT INSTRUCTIONS
She has done well    I told her to be careful using a Alia Alcarazeze a brush for the next week or 2

## 2021-09-01 ENCOUNTER — ANNUAL EXAM (OUTPATIENT)
Dept: OBGYN CLINIC | Facility: CLINIC | Age: 41
End: 2021-09-01
Payer: COMMERCIAL

## 2021-09-01 VITALS
DIASTOLIC BLOOD PRESSURE: 80 MMHG | HEIGHT: 65 IN | SYSTOLIC BLOOD PRESSURE: 118 MMHG | WEIGHT: 166 LBS | BODY MASS INDEX: 27.66 KG/M2

## 2021-09-01 DIAGNOSIS — Z11.3 SCREENING EXAMINATION FOR VENEREAL DISEASE: ICD-10-CM

## 2021-09-01 DIAGNOSIS — Z01.419 ROUTINE GYNECOLOGICAL EXAMINATION: Primary | ICD-10-CM

## 2021-09-01 PROCEDURE — 87491 CHLMYD TRACH DNA AMP PROBE: CPT | Performed by: PHYSICIAN ASSISTANT

## 2021-09-01 PROCEDURE — G0145 SCR C/V CYTO,THINLAYER,RESCR: HCPCS | Performed by: PHYSICIAN ASSISTANT

## 2021-09-01 PROCEDURE — 87591 N.GONORRHOEAE DNA AMP PROB: CPT | Performed by: PHYSICIAN ASSISTANT

## 2021-09-01 PROCEDURE — S0612 ANNUAL GYNECOLOGICAL EXAMINA: HCPCS | Performed by: PHYSICIAN ASSISTANT

## 2021-09-01 RX ORDER — LEVOCETIRIZINE DIHYDROCHLORIDE 5 MG/1
5 TABLET, FILM COATED ORAL EVERY EVENING
COMMUNITY
Start: 2021-06-28

## 2021-09-01 RX ORDER — HYDROXYCHLOROQUINE SULFATE 200 MG/1
TABLET, FILM COATED ORAL
COMMUNITY
Start: 2021-06-02 | End: 2021-09-01

## 2021-09-01 RX ORDER — FLUTICASONE PROPIONATE 50 MCG
SPRAY, SUSPENSION (ML) NASAL
COMMUNITY
Start: 2021-06-28

## 2021-09-01 NOTE — ASSESSMENT & PLAN NOTE
Pap guidelines reviewed  Pap with reflex done today secondary to history of HPV 16 in 2019  Continue yearly mammograms  Return to office for annual or as needed

## 2021-09-01 NOTE — PROGRESS NOTES
Assessment/Plan   Problem List Items Addressed This Visit        Other    Routine gynecological examination - Primary     Pap guidelines reviewed  Pap with reflex done today secondary to history of HPV 16 in 2019  Continue yearly mammograms  Return to office for annual or as needed  Relevant Orders    Liquid-based pap, screening      Other Visit Diagnoses     Screening examination for venereal disease        Relevant Orders    Chlamydia/GC amplified DNA by PCR (Completed)          Subjective:     Patient ID: Lachelle Pelaez is a 41 y o  y o  female  HPI  42 yo seen for annual exam  S/p uterine ablation in 2008, Occasional spotting  Denies bowel or bladder issues  Would like STD testing  Last pap: 7/1/2020 NILM (-)HRHPV  3/27/2019 ASCUS (+)HPV 16, Colpo: 5/2019 ECC benign  Last mammogram: 6/17/2020 BIRADS 2: Benign  The following portions of the patient's history were reviewed and updated as appropriate:   She  has a past medical history of Constipation, Edema, History of fall (05/23/2020), Hypertension (11/16/2018), Joint pain, Lump of breast, right, Lupus ([address] ), Motion sickness, and Wears glasses  She   Patient Active Problem List    Diagnosis Date Noted    Pilar cyst 07/24/2020    Routine gynecological examination 07/01/2020    Gynecologic exam normal 03/27/2019    ASCUS with positive high risk HPV cervical 04/15/2018    Papanicolaou smear, as part of routine gynecological examination 03/23/2018    Enlarged thyroid 03/23/2018     She  has a past surgical history that includes Appendectomy; Breast lumpectomy (Right, 1996); Endometrial ablation (08/2008); Tubal ligation (2007); Breast biopsy (Right, 1995); and pr exc skin benig 1 1-2 cm remaindr body (N/A, 7/16/2020)  Her family history includes Asthma in her son; Cancer (age of onset: 76) in her paternal aunt; Diabetes in her father and mother; Fibroids in her sister, sister, and sister;  Hypertension in her brother, brother, father, mother, and sister; No Known Problems in her daughter, daughter, maternal grandmother, sister, and sister; Prostate cancer (age of onset: 79) in her father  She  reports that she has never smoked  She has never used smokeless tobacco  She reports current alcohol use  She reports current drug use  Drug: Marijuana  Current Outpatient Medications   Medication Sig Dispense Refill    acetaminophen (TYLENOL) 500 mg tablet Take 500 mg by mouth every 6 (six) hours as needed for mild pain      Ascorbic Acid (VITAMIN C PO) Take by mouth      CALCIUM PO Take by mouth      ergocalciferol (ERGOCALCIFEROL) 1 25 MG (82925 UT) capsule Take 50,000 Units by mouth once a week      fluticasone (FLONASE) 50 mcg/act nasal spray INSTILL 2 SPRAYS INTO EACH NOSTRIL DAILY AS NEEDED      Lactobacillus (ACIDOPHILUS) 100 MG CAPS Take by mouth every morning       levocetirizine (XYZAL) 5 MG tablet Take 5 mg by mouth every evening      NON FORMULARY as needed OTC allergy medication      Sennosides (SENOKOT PO) Take by mouth as needed      VITAMIN D PO Take by mouth      VITAMIN E PO Take 180 mg by mouth every morning       No current facility-administered medications for this visit  She has No Known Allergies       Menstrual History:  OB History        5    Para   3    Term   3            AB   2    Living   3       SAB   1    TAB   1    Ectopic        Multiple        Live Births   3                  No LMP recorded (lmp unknown)  Patient has had an ablation  Review of Systems   Constitutional: Negative for fatigue, fever and unexpected weight change  HENT: Negative for dental problem and sinus pressure  Eyes: Negative for visual disturbance  Respiratory: Negative for cough, shortness of breath and wheezing  Cardiovascular: Negative for chest pain  Gastrointestinal: Negative for abdominal pain, blood in stool, constipation, diarrhea, nausea and vomiting  Endocrine: Negative for polydipsia  Genitourinary: Negative for difficulty urinating, dyspareunia, dysuria, frequency, hematuria, pelvic pain and urgency  Musculoskeletal: Negative for arthralgias and back pain  Neurological: Negative for dizziness, seizures, light-headedness and headaches  Psychiatric/Behavioral: Negative for suicidal ideas  The patient is not nervous/anxious  Objective:  Vitals:    09/01/21 0924   BP: 118/80   BP Location: Left arm   Patient Position: Sitting   Cuff Size: Standard   Weight: 75 3 kg (166 lb)   Height: 5' 5" (1 651 m)      Physical Exam  Constitutional:       Appearance: Normal appearance  She is well-developed  Genitourinary:      Vulva, urethra, bladder, vagina and uterus normal       No vulval condylomata, lesion, tenderness, ulcerations, Bartholin's cyst or rash noted  No signs of labial injury  No labial fusion  No inguinal adenopathy present in the right or left side  No urethral prolapse, pain, swelling, tenderness, caruncle, mass or diverticulum present  Bladder is not distended or tender  No signs of injury or lesions in the vagina  No vaginal discharge, erythema, tenderness or bleeding  No cervical motion tenderness, discharge or lesion  Uterus is not enlarged, tender, irregular or mobile  No uterine mass detected  No right or left adnexal mass present  Right adnexa not tender or full  Left adnexa not tender or full  HENT:      Head: Normocephalic and atraumatic  Neck:      Thyroid: No thyromegaly  Cardiovascular:      Rate and Rhythm: Normal rate and regular rhythm  Heart sounds: Normal heart sounds  No murmur heard  No friction rub  No gallop  Pulmonary:      Effort: Pulmonary effort is normal  No respiratory distress  Breath sounds: Normal breath sounds  No wheezing     Chest:      Breasts: Breasts are symmetrical          Right: Normal  No swelling, bleeding, inverted nipple, mass, nipple discharge, skin change or tenderness  Left: Normal  No swelling, bleeding, inverted nipple, mass, nipple discharge, skin change or tenderness  Abdominal:      General: There is no distension  Palpations: Abdomen is soft  There is no mass  Tenderness: There is no abdominal tenderness  There is no guarding or rebound  Hernia: No hernia is present  Lymphadenopathy:      Cervical: No cervical adenopathy  Upper Body:      Right upper body: No supraclavicular, axillary or pectoral adenopathy  Left upper body: No supraclavicular, axillary or pectoral adenopathy  Lower Body: No right inguinal adenopathy  No left inguinal adenopathy  Neurological:      Mental Status: She is alert and oriented to person, place, and time  Skin:     General: Skin is warm and dry     Psychiatric:         Behavior: Behavior normal

## 2021-09-02 LAB
C TRACH DNA SPEC QL NAA+PROBE: NEGATIVE
N GONORRHOEA DNA SPEC QL NAA+PROBE: NEGATIVE

## 2021-09-07 LAB
LAB AP GYN PRIMARY INTERPRETATION: NORMAL
Lab: NORMAL
PATH INTERP SPEC-IMP: NORMAL

## 2021-09-17 ENCOUNTER — HOSPITAL ENCOUNTER (OUTPATIENT)
Dept: RADIOLOGY | Age: 41
Discharge: HOME | End: 2021-09-17
Payer: COMMERCIAL

## 2021-09-17 VITALS — WEIGHT: 166 LBS | HEIGHT: 65 IN | BODY MASS INDEX: 27.66 KG/M2

## 2021-09-17 DIAGNOSIS — Z12.31 ENCOUNTER FOR SCREENING MAMMOGRAM FOR MALIGNANT NEOPLASM OF BREAST: ICD-10-CM

## 2021-09-17 PROCEDURE — 77067 SCR MAMMO BI INCL CAD: CPT

## 2021-09-17 PROCEDURE — 77063 BREAST TOMOSYNTHESIS BI: CPT

## 2021-10-06 ENCOUNTER — HOSPITAL ENCOUNTER (OUTPATIENT)
Dept: NON INVASIVE DIAGNOSTICS | Facility: CLINIC | Age: 41
Discharge: HOME | End: 2021-10-06
Payer: COMMERCIAL

## 2021-10-06 DIAGNOSIS — Z00.5 EXAMINATION OF POTENTIAL DONOR OF ORGAN AND TISSUE: ICD-10-CM

## 2021-10-06 LAB
CHEST PAIN STATEMENT: NORMAL
MAX DIASTOLIC BP: 70 MMHG
MAX HEART RATE: 171 BPM
MAX PREDICTED HEART RATE: 178 BPM
MAX. SYSTOLIC BP: 178 MMHG
PROTOCOL NAME: NORMAL
TARGET HR FORMULA: NORMAL
TEST INDICATION: NORMAL
TIME IN EXERCISE PHASE: NORMAL

## 2021-10-06 PROCEDURE — 78452 HT MUSCLE IMAGE SPECT MULT: CPT | Performed by: INTERNAL MEDICINE

## 2021-10-06 PROCEDURE — 93016 CV STRESS TEST SUPVJ ONLY: CPT | Performed by: INTERNAL MEDICINE

## 2021-10-06 PROCEDURE — 93018 CV STRESS TEST I&R ONLY: CPT | Performed by: INTERNAL MEDICINE

## 2021-10-06 PROCEDURE — 93017 CV STRESS TEST TRACING ONLY: CPT

## 2021-10-06 PROCEDURE — 78452 HT MUSCLE IMAGE SPECT MULT: CPT

## 2021-10-06 PROCEDURE — A9502 TC99M TETROFOSMIN: HCPCS

## 2021-10-12 ENCOUNTER — TELEPHONE (OUTPATIENT)
Dept: OBGYN CLINIC | Facility: CLINIC | Age: 41
End: 2021-10-12

## 2021-10-12 DIAGNOSIS — R82.71 BACTERIA IN URINE: Primary | ICD-10-CM

## 2021-10-12 RX ORDER — NITROFURANTOIN 25; 75 MG/1; MG/1
100 CAPSULE ORAL 2 TIMES DAILY
Qty: 14 CAPSULE | Refills: 0 | Status: SHIPPED | OUTPATIENT
Start: 2021-10-12

## 2021-10-28 ENCOUNTER — HOSPITAL ENCOUNTER (OUTPATIENT)
Dept: NON INVASIVE DIAGNOSTICS | Facility: CLINIC | Age: 41
Discharge: HOME | End: 2021-10-28
Payer: COMMERCIAL

## 2021-10-28 ENCOUNTER — HOSPITAL ENCOUNTER (OUTPATIENT)
Dept: RADIOLOGY | Facility: HOSPITAL | Age: 41
Discharge: HOME | End: 2021-10-28
Payer: COMMERCIAL

## 2021-10-28 VITALS
HEIGHT: 65 IN | HEART RATE: 55 BPM | DIASTOLIC BLOOD PRESSURE: 80 MMHG | WEIGHT: 166 LBS | BODY MASS INDEX: 27.66 KG/M2 | SYSTOLIC BLOOD PRESSURE: 118 MMHG

## 2021-10-28 DIAGNOSIS — Z00.5 ENCOUNTER FOR EXAMINATION OF POTENTIAL DONOR OF ORGAN AND TISSUE: ICD-10-CM

## 2021-10-28 DIAGNOSIS — Z00.5 EXAMINATION OF POTENTIAL DONOR OF ORGAN AND TISSUE: ICD-10-CM

## 2021-10-28 LAB
AORTIC ROOT: 2.7 CM
APICAL FOUR CHAMBER EJECTION FRACTION: 70 %
E WAVE DECELERATION TIME: 167 MS
FRACTIONAL SHORTENING: 34 % (ref 28–44)
INTERVENTRICULAR SEPTUM IN DIASTOLE (PARASTERNAL SHORT AXIS VIEW): 1.1 CM
LEFT INTERNAL DIMENSION IN SYSTOLE: 2.3 CM (ref 2.1–4)
LEFT VENTRICULAR INTERNAL DIMENSION IN DIASTOLE: 3.5 CM (ref 4.3–6.41)
LEFT VENTRICULAR POSTERIOR WALL IN END DIASTOLE: 0.9 CM
LEFT VENTRICULAR STROKE VOLUME: 33 ML
LV EF: 58 %
MV E'TISSUE VEL-SEP: 13 CM/S
MV PEAK A VEL: 0.55 M/S
MV PEAK E VEL: 77 CM/S
MV STENOSIS PRESSURE HALF TIME: 0 MS
RIGHT VENTRICLE ID DIMENSION: 2.9 CM
SL CV LV EF: 55
SL CV PED ECHO LEFT VENTRICLE DIASTOLIC VOLUME (MOD BIPLANE) 2D: 50 ML
SL CV PED ECHO LEFT VENTRICLE SYSTOLIC VOLUME (MOD BIPLANE) 2D: 18 ML
TR PEAK VELOCITY: 1.6 M/S
TRICUSPID VALVE PEAK REGURGITATION VELOCITY: 1.55 M/S
TRICUSPID VALVE S': 0.8 CM/S
TV PEAK GRADIENT: 10 MMHG
Z-SCORE OF LEFT VENTRICULAR DIMENSION IN END SYSTOLE: -4.06

## 2021-10-28 PROCEDURE — 93306 TTE W/DOPPLER COMPLETE: CPT | Performed by: INTERNAL MEDICINE

## 2021-10-28 PROCEDURE — 93306 TTE W/DOPPLER COMPLETE: CPT

## 2021-10-28 PROCEDURE — 71046 X-RAY EXAM CHEST 2 VIEWS: CPT

## 2022-01-17 ENCOUNTER — HOSPITAL ENCOUNTER (OUTPATIENT)
Dept: ULTRASOUND IMAGING | Facility: HOSPITAL | Age: 42
Discharge: HOME | End: 2022-01-17
Payer: COMMERCIAL

## 2022-01-17 DIAGNOSIS — N83.209 CYST OF OVARY, UNSPECIFIED LATERALITY: ICD-10-CM

## 2022-01-17 PROCEDURE — 76856 US EXAM PELVIC COMPLETE: CPT

## 2022-01-17 PROCEDURE — 76830 TRANSVAGINAL US NON-OB: CPT

## 2022-05-12 ENCOUNTER — HOSPITAL ENCOUNTER (OUTPATIENT)
Dept: ULTRASOUND IMAGING | Facility: HOSPITAL | Age: 42
Discharge: HOME | End: 2022-05-12
Payer: COMMERCIAL

## 2022-05-12 DIAGNOSIS — N28.1 ACQUIRED CYST OF KIDNEY: ICD-10-CM

## 2022-05-12 PROCEDURE — 76770 US EXAM ABDO BACK WALL COMP: CPT

## 2022-11-30 ENCOUNTER — ANNUAL EXAM (OUTPATIENT)
Dept: OBGYN CLINIC | Facility: CLINIC | Age: 42
End: 2022-11-30

## 2022-11-30 VITALS
WEIGHT: 165.6 LBS | SYSTOLIC BLOOD PRESSURE: 140 MMHG | BODY MASS INDEX: 27.59 KG/M2 | HEIGHT: 65 IN | DIASTOLIC BLOOD PRESSURE: 80 MMHG

## 2022-11-30 DIAGNOSIS — A64 STI (SEXUALLY TRANSMITTED INFECTION): ICD-10-CM

## 2022-11-30 DIAGNOSIS — Z01.419 WOMEN'S ANNUAL ROUTINE GYNECOLOGICAL EXAMINATION: Primary | ICD-10-CM

## 2022-11-30 NOTE — PROGRESS NOTES
[facility]   Annual Well Woman Exam  [place]    ASSESSMENT & PLAN: Sanket Maldonado is a 35 y o  [id number] with normal gynecologic exam     1   Routine well woman exam done today  2   Pap and HPV: Pap with HPV was done today  Current ASCCP Guidelines reviewed  3   Mammogram ordered  Recommend yearly mammography per ACOG guidelines  4   The patient desired STD testing-GC/CT/Trich collected, ordered HIV and RPR  5  The patient is sexually active  She is s/p BTL for contraception  6  The following were reviewed in today's visit: breast self exam, exercise and healthy diet  7  Patient to return to office in 12 months for annual, sooner PRN  All questions answered to the best of my ability  Subjective:    CC:  Annual Gynecologic Examination    HPI: Sanket Maldonado is a 35 y o  [id number] who presents for annual gynecologic examination  She has the following concerns:      Gardasil: doesn't think she has gotten it, will consider  Mammo:  NA  Colonoscopy: NA    Lipids: ordered by PCP  TSH: --wnl  A1C: ordered tdoay    GYN  Complaints: denies  Denies pelvic pain  Menstrual cycles are non-existent  Ablation in past after s/p BTL  Cyclic breast tenderness and mild crampiness    She does not report hot flashes, night sweats, other symptoms of menopause  Sexually active: yes, stable male partner    Birth control: s/p BTL  Hx STI: remotely   Last Pap: ASCUS, HPV+ in 2019, normal since    OB  [id number]     G/U  Complaints: denies  Denies hematuria and dysuria    Breast  Complaints: denies  Denies: breast lump, nipple discharge and skin color change  Family hx:   Dad's sister with breast cancer ( in 62s)  [de-identified] oldest cancer  from lung cancer  Denies fhx of uterine, ovarian, or colon cancers  Had genetic testing done in preparation to donate kidney to her mom  Her mom is on dialysis (lives in [place])  Patient does do regular self-exams    Health Maintenance:    She does follow with a PCP      She does not exercise--has applewatch  She does not follow a well balanced diet--working on increasing fruits and vegetables, eating less meat, mostly white meats, trying to move to pescetarian diet  She does not use tobacco  Minimal marijuana use  She feels safe at home  Past Medical History:   Diagnosis Date   • Abnormal Pap smear of cervix    • Constipation    • Edema     lower extremities on occ   • History of fall 05/23/2020    feeling of tiredness, slowly passed out-could hear, but couldn't respond   • Hypertension 11/16/2018   • Joint pain     elbows, hips, knees   • Lump of breast, right     benign biopsies   • Lupus ([address] )     went to rheumatologist 7/10/2020 to R/O lupus-blood work not back yet   • Migraine Over a year ago   • Motion sickness    • Wears glasses     currently not wearing       Past Surgical History:   Procedure Laterality Date   • APPENDECTOMY     • BREAST BIOPSY Right 1995    benign   • BREAST LUMPECTOMY Right 1995   • ENDOMETRIAL ABLATION  08/2008   • OH EXC SKIN BENIG 1 1-2 CM REMAINDR BODY N/A 7/16/2020    Procedure: EXCISION LESION/MASS SCALP;  Surgeon: Kelly Schofield MD;  Location: [address] [place] [address];  Service: General   • TUBAL LIGATION  2007       Past OB/Gyn History:   No LMP recorded  Patient has had an ablation      Family History:  Family History   Problem Relation Age of Onset   • Diabetes Mother    • Hypertension Mother    • Prostate cancer Father 79   • Diabetes Father    • Hypertension Father    • Colon cancer Father         Prostate Cancer   • Hypertension Brother    • Cancer Paternal Aunt 74        unknown type   • Fibroids Sister    • No Known Problems Daughter    • No Known Problems Maternal Grandmother    • No Known Problems Daughter    • Asthma Son    • No Known Problems Sister    • No Known Problems Sister    • Hypertension Sister    • Fibroids Sister    • Fibroids Sister    • Hypertension Brother    • Asthma Son        Social History:  Social History     Socioeconomic History   • Marital status: /Civil Union     Spouse name: Not on file   • Number of children: Not on file   • Years of education: Not on file   • Highest education level: Not on file   Occupational History   • Not on file   Tobacco Use   • Smoking status: Never   • Smokeless tobacco: Never   Vaping Use   • Vaping Use: Never used   Substance and Sexual Activity   • Alcohol use:  Yes     Alcohol/week: 1 0 standard drink     Types: 1 Glasses of wine per week     Comment: 3 drinks/wk   • Drug use: Not Currently     Types: Marijuana   • Sexual activity: Yes     Partners: Male     Birth control/protection: Condom Male     Comment: pt  requests std testing   Other Topics Concern   • Not on file   Social History Narrative   • Not on file     Social Determinants of Health     Financial Resource Strain: Not on file   Food Insecurity: Not on file   Transportation Needs: Not on file   Physical Activity: Not on file   Stress: Not on file   Social Connections: Not on file   Intimate Partner Violence: Not on file   Housing Stability: Not on file         No Known Allergies    Current Outpatient Medications:   •  acetaminophen (TYLENOL) 500 mg tablet, Take 500 mg by mouth every 6 (six) hours as needed for mild pain, Disp: , Rfl:   •  Ascorbic Acid (VITAMIN C PO), Take by mouth, Disp: , Rfl:   •  CALCIUM PO, Take by mouth, Disp: , Rfl:   •  fluticasone (FLONASE) 50 mcg/act nasal spray, INSTILL 2 SPRAYS INTO EACH NOSTRIL DAILY AS NEEDED, Disp: , Rfl:   •  Lactobacillus (ACIDOPHILUS) 100 MG CAPS, Take by mouth every morning , Disp: , Rfl:   •  NON FORMULARY, as needed OTC allergy medication, Disp: , Rfl:   •  Sennosides (SENOKOT PO), Take by mouth as needed, Disp: , Rfl:   •  VITAMIN D PO, Take by mouth, Disp: , Rfl:   •  VITAMIN E PO, Take 180 mg by mouth every morning, Disp: , Rfl:   •  ergocalciferol (ERGOCALCIFEROL) 1 25 MG (70078 UT) capsule, Take 50,000 Units by mouth once a week, Disp: , Rfl:   •  levocetirizine (XYZAL) 5 MG tablet, Take 5 mg by mouth every evening (Patient not taking: Reported on 11/30/2022), Disp: , Rfl:   •  nitrofurantoin (MACROBID) 100 mg capsule, Take 1 capsule (100 mg total) by mouth 2 (two) times a day, Disp: 14 capsule, Rfl: 0    Review of Systems:  Denies fevers, chills, unintentional weight loss, excessive fatigue, chest pain, shortness of breath, abdominal pain, nausea, vomiting, urinary incontinence, urinary frequency, vaginal bleeding, vaginal discharge  All other systems negative unless otherwise stated  Physical Exam:  /80 (BP Location: Left arm, Patient Position: Sitting, Cuff Size: Standard)   Ht 5' 5" (1 651 m)   Wt 75 1 kg (165 lb 9 6 oz)   BMI 27 56 kg/m²  Body mass index is 27 56 kg/m²  GEN: The patient was alert and oriented x3, pleasant well-appearing female in no acute distress  HEENT:  Unremarkable, no anterior or posterior lymphadenopathy, no thyromegaly  CV:  Regular rate and rhythm, normal S1 and S2, no murmurs  RESP:  Clear to auscultation bilaterally, no wheezes, rales or rhonchi  BREAST:  Symmetric breasts with no palpable breast masses or obvious breast lesions  She has no retractions or nipple discharge  She has no axillary abnormalities or palpable masses  GI:  Soft, nontender, non-distended  MSK: bilateral lower extremities are nontender, no edema  : Normal appearing external female genitalia, normal appearing urethral meatus  On sterile speculum exam, normal appearing vaginal epithelium, no vaginal discharge, no bleeding, grossly normal appearing cervix  On bimanual exam, no cervical motion tenderness; uterus is smooth, mobile, non-tender, normal size  No tenderness or fullness in the bilateral adnexa     YUE: no masses, normal tone, neg FOBT

## 2022-12-01 LAB
C TRACH DNA SPEC QL NAA+PROBE: NEGATIVE
HPV HR 12 DNA CVX QL NAA+PROBE: NEGATIVE
HPV16 DNA CVX QL NAA+PROBE: NEGATIVE
HPV18 DNA CVX QL NAA+PROBE: NEGATIVE
N GONORRHOEA DNA SPEC QL NAA+PROBE: NEGATIVE

## 2022-12-02 LAB
M GENITALIUM DNA SPEC QL NAA+PROBE: NEGATIVE
T VAGINALIS DNA SPEC QL NAA+PROBE: NEGATIVE

## 2022-12-06 LAB
LAB AP GYN PRIMARY INTERPRETATION: NORMAL
Lab: NORMAL
PATH INTERP SPEC-IMP: NORMAL

## 2023-01-18 ENCOUNTER — APPOINTMENT (OUTPATIENT)
Dept: LAB | Facility: HOSPITAL | Age: 43
End: 2023-01-18
Attending: STUDENT IN AN ORGANIZED HEALTH CARE EDUCATION/TRAINING PROGRAM

## 2023-01-18 ENCOUNTER — HOSPITAL ENCOUNTER (OUTPATIENT)
Dept: ULTRASOUND IMAGING | Facility: HOSPITAL | Age: 43
Discharge: HOME | End: 2023-01-18
Attending: FAMILY MEDICINE

## 2023-01-18 DIAGNOSIS — Q44.6 CONGENITAL CYSTIC DISEASE OF LIVER: ICD-10-CM

## 2023-01-18 DIAGNOSIS — I10 ESSENTIAL HYPERTENSION, MALIGNANT: ICD-10-CM

## 2023-01-18 DIAGNOSIS — E78.2 MIXED HYPERLIPIDEMIA: ICD-10-CM

## 2023-01-18 DIAGNOSIS — Z01.419 WOMEN'S ANNUAL ROUTINE GYNECOLOGICAL EXAMINATION: ICD-10-CM

## 2023-01-18 DIAGNOSIS — A64 STI (SEXUALLY TRANSMITTED INFECTION): ICD-10-CM

## 2023-01-18 LAB
ALBUMIN SERPL BCP-MCNC: 4.1 G/DL (ref 3.5–5)
ALP SERPL-CCNC: 42 U/L (ref 34–104)
ALT SERPL W P-5'-P-CCNC: 8 U/L (ref 7–52)
ANION GAP SERPL CALCULATED.3IONS-SCNC: 6 MMOL/L (ref 4–13)
AST SERPL W P-5'-P-CCNC: 12 U/L (ref 13–39)
BASOPHILS # BLD AUTO: 0.03 THOUSANDS/ÂΜL (ref 0–0.1)
BASOPHILS NFR BLD AUTO: 1 % (ref 0–1)
BILIRUB SERPL-MCNC: 0.75 MG/DL (ref 0.2–1)
BUN SERPL-MCNC: 11 MG/DL (ref 5–25)
CALCIUM SERPL-MCNC: 9.3 MG/DL (ref 8.4–10.2)
CHLORIDE SERPL-SCNC: 104 MMOL/L (ref 96–108)
CHOLEST SERPL-MCNC: 163 MG/DL
CO2 SERPL-SCNC: 27 MMOL/L (ref 21–32)
CREAT SERPL-MCNC: 0.81 MG/DL (ref 0.6–1.3)
EOSINOPHIL # BLD AUTO: 0.02 THOUSAND/ÂΜL (ref 0–0.61)
EOSINOPHIL NFR BLD AUTO: 0 % (ref 0–6)
ERYTHROCYTE [DISTWIDTH] IN BLOOD BY AUTOMATED COUNT: 12.9 % (ref 11.6–15.1)
EST. AVERAGE GLUCOSE BLD GHB EST-MCNC: 103 MG/DL
GFR SERPL CREATININE-BSD FRML MDRD: 88 ML/MIN/1.73SQ M
GLUCOSE P FAST SERPL-MCNC: 88 MG/DL (ref 65–99)
HBA1C MFR BLD: 5.2 %
HCT VFR BLD AUTO: 38.6 % (ref 34.8–46.1)
HDLC SERPL-MCNC: 54 MG/DL
HGB BLD-MCNC: 13.2 G/DL (ref 11.5–15.4)
HIV 1+2 AB+HIV1 P24 AG SERPL QL IA: NORMAL
HIV 2 AB SERPL QL IA: NORMAL
HIV1 AB SERPL QL IA: NORMAL
HIV1 P24 AG SERPL QL IA: NORMAL
IMM GRANULOCYTES # BLD AUTO: 0.01 THOUSAND/UL (ref 0–0.2)
IMM GRANULOCYTES NFR BLD AUTO: 0 % (ref 0–2)
LDLC SERPL CALC-MCNC: 88 MG/DL (ref 0–100)
LYMPHOCYTES # BLD AUTO: 1.77 THOUSANDS/ÂΜL (ref 0.6–4.47)
LYMPHOCYTES NFR BLD AUTO: 31 % (ref 14–44)
MCH RBC QN AUTO: 30 PG (ref 26.8–34.3)
MCHC RBC AUTO-ENTMCNC: 34.2 G/DL (ref 31.4–37.4)
MCV RBC AUTO: 88 FL (ref 82–98)
MONOCYTES # BLD AUTO: 0.32 THOUSAND/ÂΜL (ref 0.17–1.22)
MONOCYTES NFR BLD AUTO: 6 % (ref 4–12)
NEUTROPHILS # BLD AUTO: 3.49 THOUSANDS/ÂΜL (ref 1.85–7.62)
NEUTS SEG NFR BLD AUTO: 62 % (ref 43–75)
NONHDLC SERPL-MCNC: 109 MG/DL
NRBC BLD AUTO-RTO: 0 /100 WBCS
PLATELET # BLD AUTO: 278 THOUSANDS/UL (ref 149–390)
PMV BLD AUTO: 11 FL (ref 8.9–12.7)
POTASSIUM SERPL-SCNC: 3.7 MMOL/L (ref 3.5–5.3)
PROT SERPL-MCNC: 7 G/DL (ref 6.4–8.4)
RBC # BLD AUTO: 4.4 MILLION/UL (ref 3.81–5.12)
RPR SER QL: NORMAL
SODIUM SERPL-SCNC: 137 MMOL/L (ref 135–147)
TRIGL SERPL-MCNC: 105 MG/DL
TSH SERPL DL<=0.05 MIU/L-ACNC: 1.12 UIU/ML (ref 0.45–4.5)
WBC # BLD AUTO: 5.64 THOUSAND/UL (ref 4.31–10.16)

## 2023-01-31 ENCOUNTER — TELEPHONE (OUTPATIENT)
Dept: NEPHROLOGY | Facility: CLINIC | Age: 43
End: 2023-01-31

## 2023-01-31 NOTE — TELEPHONE ENCOUNTER
New Patient Intake Form   Patient Details   [address]     1980     [id number]     Insurance Information   Name of Bessie warner   Does the patient need an insurance referral? no   If patient has [employer], please ask if they will be using their [facility]  Appointment Information   Who is calling to schedule? If not patient, what is callers name? Patient   Referring Provider  Dr Carlos Carpenter   Reason for Appt (Diagnosis) Donating kidney to mother    Does Patient have labs/urine done at [place] [address]? If not, where do they go? List the date of last lab / urine yes   Has patient been hospitalized recently? If yes, list name and location of hospital they were in no   Has patient been seen by a Nephrologist before? If yes, list name, location and phone number yes   Has the patient had renal imaging done? If so, list the most recent date and type of imaging yes    Does patient have a history of Kidney Stones?  no   Appointment Details   Is there a referral on file? no    Appointment Date 3/3/2023 @3pm    Location  [facility]   Miscellaneous

## 2023-03-03 ENCOUNTER — OFFICE VISIT (OUTPATIENT)
Dept: NEPHROLOGY | Facility: CLINIC | Age: 43
End: 2023-03-03

## 2023-03-03 VITALS
SYSTOLIC BLOOD PRESSURE: 124 MMHG | DIASTOLIC BLOOD PRESSURE: 82 MMHG | WEIGHT: 168.4 LBS | HEIGHT: 65 IN | BODY MASS INDEX: 28.06 KG/M2

## 2023-03-03 DIAGNOSIS — I10 PRIMARY HYPERTENSION: ICD-10-CM

## 2023-03-03 DIAGNOSIS — N20.0 NEPHROLITHIASIS: Primary | ICD-10-CM

## 2023-03-03 RX ORDER — UBIDECARENONE 75 MG
50 CAPSULE ORAL DAILY
COMMUNITY

## 2023-03-03 NOTE — PATIENT INSTRUCTIONS
Your kidney function is stable  The most recent ultrasound in Jan 2023 did show a stone on the left kidney  Please stop Vitamin C  Try to increase your daily fluid intake to >2 5 liters per day  Check a 24 hour urine via Litholink  Follow up in 1 year

## 2023-03-03 NOTE — PROGRESS NOTES
NEPHROLOGY OUTPATIENT CONSULTATION   Alix Anderson 38 y o  female MRN: [id number]  Date: 03/03/23  Reason for consultation: Initial evaluation of nephrolithiasis    ASSESSMENT and PLAN:  1  Nephrolithiasis:  · Adria Kapadia has a 4 mm left sided kidney stone based on ultrasound from January 2023  It seems that this kidney stone was present during an ultrasound performed at [facility] in February 2022  · Fortunately, Adria Kapadia is not an active stone former  There has not been any evidence that her stone burden has increased over the past year  She does not have any symptoms of stone passage, gravel like urine, or stephanie urine  · She had a 24-hour urine done via Litholink in January 2022 - that test did not reveal the presence of hypercalciuria, severe hypocitraturia, or hyperoxaluria  I see no need to check a PTH as her urinary calcium was not high  · For her nephrolithiasis, I simply recommend that she increase her oral fluid intake to about 2 5 liters per day and stop her Vitamin C supplements  · I will order a repeat 24 hour urine via Litholink to reassess her stone risk  · Further recommendations will follow once we have her results  2  Potential kidney transplant donor:  · I informed Adria Kapadia that the decision on whether or not she would be an acceptable candidate for kidney donation is being deferred to the team at [facility]      3  Hypertension:  · BP is currently at goal (<135/85)  · Medications: None  · No changes  Patient Instructions   Your kidney function is stable  The most recent ultrasound in Jan 2023 did show a stone on the left kidney  Please stop Vitamin C  Try to increase your daily fluid intake to >2 5 liters per day  Check a 24 hour urine via Litholink  Follow up in 1 year  HISTORY OF PRESENT ILLNESS:  Requesting Physician: Verenice Winter MD    Alix Anderson is a 38 y o  female who was referred for evaluation of nephrolithiasis in the setting of a potential kidney donation  Laci Green has a history of hypertension, GERD, arthritis, liver cysts, and nephrolithiasis  Her hypertension was diagnosed in 2021 and was initially treated with lisinopril  With lifestyle modifications, her blood pressure improved and her lisinopril was weaned off  She only required lisinopril for about 6 to 8 months and has been off it for over a year now  She reports her BP at doctor's offices has been at goal despite being off lisinopril  She is currently being evaluated as a kidney transplant donor for her mother who is on dialysis  Based on some of the records that I was able to review, I note that she had a 24 hour urine done via Litholink for Dr Elda Saab (nephrology at [facility])  Laci Green showed me some of the notes from [facility] on her phone and I was able to see that she apparently had an ultrasound in Feb 2022 which revealed a 5 mm kidney stone on the left kidney  She had a repeat kidney ultrasound in May 2022 which showed no evidence of nephrolithiasis  However, an abdominal ultrasound done in January 2023 for liver cysts showed a 4 mm left kidney stone  She was seen by Dr Justice Mathew of urology and was sent to us for evaluation  Her most recent lab work is from January 18, 2023 which revealed a creatinine of 0 81  Her electrolytes are within normal limits  She has not had an updated Litholink since January 2022  She has never passed kidney stones before  She has no acute complaints at this time  She denies any history of gravel-like or stephanie urine  PAST MEDICAL HISTORY:  1  HTN: Diagnosed in 2021  No admissions for HTN urgency  Highest recalled SBP is 150s/90s mm Hg  She was treated with Lisinopril 10 mg daily with improvement  Her meds were then weaned down and she has been off Lisinopril for over a year  She was on it for about 6-8 months  2  Nephrolithiasis: This was incidentally found on ultrasound  She has never passed a stone  3  Liver cysts  4  GERD: diet controlled now  5  OA    No known history of DM, HLP, CAD, CHF, CVA, PAD, COPD, DOMINGO, asthma, thromboembolism, malignancy, degenerative disc disease, psychiatric disease  PAST SURGICAL HISTORY:  1  Appendectomy  2  R breast cyst excision  ALLERGIES:  No Known Allergies    SOCIAL HISTORY:  · Non smoker  · She smokes marijuana  · She drinks a glass of wine most days  · No IVDA  · She has 3 kids and is   FAMILY HISTORY:  · Mother is on dialysis - etiology felt to be due to DM  MEDICATIONS:    Current Outpatient Medications:   •  acetaminophen (TYLENOL) 500 mg tablet, Take 500 mg by mouth every 6 (six) hours as needed for mild pain, Disp: , Rfl:   •  Ascorbic Acid (VITAMIN C PO), Take by mouth, Disp: , Rfl:   •  CALCIUM PO, Take by mouth, Disp: , Rfl:   •  cyanocobalamin (VITAMIN B-12) 100 mcg tablet, Take 50 mcg by mouth daily, Disp: , Rfl:   •  fluticasone (FLONASE) 50 mcg/act nasal spray, INSTILL 2 SPRAYS INTO EACH NOSTRIL DAILY AS NEEDED, Disp: , Rfl:   •  Lactobacillus (ACIDOPHILUS) 100 MG CAPS, Take by mouth every morning , Disp: , Rfl:   •  NON FORMULARY, as needed OTC allergy medication, Disp: , Rfl:   •  Sennosides (SENOKOT PO), Take by mouth as needed, Disp: , Rfl:   •  VITAMIN D PO, Take by mouth, Disp: , Rfl:   •  VITAMIN E PO, Take 180 mg by mouth every morning, Disp: , Rfl:   •  ergocalciferol (ERGOCALCIFEROL) 1 25 MG (97976 UT) capsule, Take 50,000 Units by mouth once a week, Disp: , Rfl:   •  levocetirizine (XYZAL) 5 MG tablet, Take 5 mg by mouth every evening (Patient not taking: Reported on 11/30/2022), Disp: , Rfl:   •  nitrofurantoin (MACROBID) 100 mg capsule, Take 1 capsule (100 mg total) by mouth 2 (two) times a day, Disp: 14 capsule, Rfl: 0    REVIEW OF SYSTEMS:  Review of Systems   Constitutional: Negative for appetite change, chills, fatigue and fever  HENT: Positive for rhinorrhea  Negative for hearing loss, sinus pressure and sinus pain      Eyes: Positive for visual disturbance  Respiratory: Negative for cough and shortness of breath  Cardiovascular: Negative for chest pain and leg swelling  Gastrointestinal: Negative for abdominal pain, diarrhea, nausea and vomiting  Genitourinary: Negative for difficulty urinating, dysuria and hematuria  Musculoskeletal: Positive for arthralgias  Negative for back pain  Skin: Negative for rash  Allergic/Immunologic: Negative for immunocompromised state  Neurological: Negative for dizziness and light-headedness  Hematological: Does not bruise/bleed easily  Psychiatric/Behavioral: Negative for dysphoric mood  The patient is not nervous/anxious  All the systems were reviewed and were negative except as documented on the HPI  PHYSICAL EXAMINATION:  /82   Ht 5' 5" (1 651 m)   Wt 76 4 kg (168 lb 6 4 oz)   BMI 28 02 kg/m²   Current Weight:   Body mass index is 28 02 kg/m²  General: conscious, coherent, cooperative, not in distress  Skin: warm, dry, good turgor  Eyes: pink conjunctivae, no scleral icterus  ENT: moist lips and mucous membranes  Respiratory: equal chest expansion, clear breath sounds  Cardiovascular: distinct heart sounds, normal rate, regular rhythm, no rub  Abdomen: soft, non-tender, non-distended, normoactive bowel sounds  Extremities: no edema  Genitourinary: no scruggs catheter  Neuro: awake, alert, oriented to time, place and person  Psych: appropriate affect       LABORATORY RESULTS:  Results for orders placed or performed in visit on 01/18/23   RPR   Result Value Ref Range    RPR Non-Reactive Non-Reactive   CBC and differential   Result Value Ref Range    WBC 5 64 4 31 - 10 16 Thousand/uL    RBC 4 40 3 81 - 5 12 Million/uL    Hemoglobin 13 2 11 5 - 15 4 g/dL    Hematocrit 38 6 34 8 - 46 1 %    MCV 88 82 - 98 fL    MCH 30 0 26 8 - 34 3 pg    MCHC 34 2 31 4 - 37 4 g/dL    RDW 12 9 11 6 - 15 1 %    MPV 11 0 8 9 - 12 7 fL    Platelets 552 199 - 146 Thousands/uL    nRBC 0 /100 WBCs Neutrophils Relative 62 43 - 75 %    Immat GRANS % 0 0 - 2 %    Lymphocytes Relative 31 14 - 44 %    Monocytes Relative 6 4 - 12 %    Eosinophils Relative 0 0 - 6 %    Basophils Relative 1 0 - 1 %    Neutrophils Absolute 3 49 1 85 - 7 62 Thousands/µL    Immature Grans Absolute 0 01 0 00 - 0 20 Thousand/uL    Lymphocytes Absolute 1 77 0 60 - 4 47 Thousands/µL    Monocytes Absolute 0 32 0 17 - 1 22 Thousand/µL    Eosinophils Absolute 0 02 0 00 - 0 61 Thousand/µL    Basophils Absolute 0 03 0 00 - 0 10 Thousands/µL   Comprehensive metabolic panel   Result Value Ref Range    Sodium 137 135 - 147 mmol/L    Potassium 3 7 3 5 - 5 3 mmol/L    Chloride 104 96 - 108 mmol/L    CO2 27 21 - 32 mmol/L    ANION GAP 6 4 - 13 mmol/L    BUN 11 5 - 25 mg/dL    Creatinine 0 81 0 60 - 1 30 mg/dL    Glucose, Fasting 88 65 - 99 mg/dL    Calcium 9 3 8 4 - 10 2 mg/dL    AST 12 (L) 13 - 39 U/L    ALT 8 7 - 52 U/L    Alkaline Phosphatase 42 34 - 104 U/L    Total Protein 7 0 6 4 - 8 4 g/dL    Albumin 4 1 3 5 - 5 0 g/dL    Total Bilirubin 0 75 0 20 - 1 00 mg/dL    eGFR 88 ml/min/1 73sq m   Lipid panel   Result Value Ref Range    Cholesterol 163 See Comment mg/dL    Triglycerides 105 See Comment mg/dL    HDL, Direct 54 >=50 mg/dL    LDL Calculated 88 0 - 100 mg/dL    Non-HDL-Chol (CHOL-HDL) 109 mg/dl   TSH, 3rd generation   Result Value Ref Range    TSH 3RD GENERATON 1 125 0 450 - 4 500 uIU/mL   Hemoglobin A1C   Result Value Ref Range    Hemoglobin A1C 5 2 Normal 3 8-5 6%; PreDiabetic 5 7-6 4%;  Diabetic >=6 5%; Glycemic control for adults with diabetes <7 0% %     mg/dl   HIV 1/2 AG/AB w Reflex [facility] for 2 yr old and above   Result Value Ref Range    HIV-1 p24 Antigen Non-Reactive Non-Reactive    HIV-1 Antibody Non-Reactive Non-Reactive    HIV-2 Antibody Non-Reactive Non-Reactive    HIV Ag-Ab 5th Gen Non-Reactive Non-Reactive     January 10, 2022 - 24-hour urine via Litholink  Volume 2 39 L  Urine calcium 56  Urine oxalate 31  Urine citrate 509  Urine uric acid 0 846 gm  IMAGIN23 abd US: R 10 3 cm, L 10 2 cm, no hydronephrosis  4 mm nonobstructing left renal calculus  22 renal US: R 10 1 cm, L 9 8 cm, no hydronephrosis, no nephrolithiasis

## 2023-05-04 ENCOUNTER — TELEPHONE (OUTPATIENT)
Dept: NEPHROLOGY | Facility: CLINIC | Age: 43
End: 2023-05-04

## 2023-05-04 NOTE — TELEPHONE ENCOUNTER
Message left on patient's VM that 24 urine results were good  Urine volume lower than goal   Per Dr Radha Cochran, increase fluid intake to 2 5 liters per day           ----- Message from Roberto Morejon MD sent at 5/3/2023 12:38 PM EDT -----  Please inform patient that 24 hour urine via Litholink results were good  Her urine volume was lower than goal at 1 84  Would recommend increasing fluid intake to about 2 5 liters per day

## 2023-10-06 ENCOUNTER — OFFICE VISIT (OUTPATIENT)
Dept: FAMILY MEDICINE CLINIC | Facility: CLINIC | Age: 43
End: 2023-10-06
Payer: COMMERCIAL

## 2023-10-06 VITALS
DIASTOLIC BLOOD PRESSURE: 84 MMHG | HEIGHT: 65 IN | BODY MASS INDEX: 27.32 KG/M2 | TEMPERATURE: 97.7 F | OXYGEN SATURATION: 99 % | WEIGHT: 164 LBS | HEART RATE: 78 BPM | SYSTOLIC BLOOD PRESSURE: 130 MMHG

## 2023-10-06 DIAGNOSIS — Z12.31 ENCOUNTER FOR SCREENING MAMMOGRAM FOR MALIGNANT NEOPLASM OF BREAST: ICD-10-CM

## 2023-10-06 DIAGNOSIS — M77.11 LATERAL EPICONDYLITIS OF RIGHT ELBOW: Primary | ICD-10-CM

## 2023-10-06 PROCEDURE — 99214 OFFICE O/P EST MOD 30 MIN: CPT | Performed by: FAMILY MEDICINE

## 2023-10-06 NOTE — PROGRESS NOTES
Assessment/Plan:   1. Lateral epicondylitis of right elbow  Symptoms appear secondary to lateral epicondylitis. She was educated on the pathophysiology of this problem. At this time, she may start a home therapy regimen. She may apply Voltaren gel over this area. She may ice as well for 10 to 15 minutes multiple times a day. She may take ibuprofen 600 mg every 6 hours for the next 2 days. She may then take this as needed. If symptoms or not improving, will consider CS injection as well as physical therapy. 2. Encounter for screening mammogram for malignant neoplasm of breast  - Mammo screening bilateral w 3d & cad; Future           There are no diagnoses linked to this encounter. Subjective:       Chief Complaint   Patient presents with   • Elbow Pain     Pt complains of pain in the right elbow area for about 6 months but has gotten worse since June      Patient ID: Heather Olivares is a 38 y.o. female presents today as a new patient to establish care. Elbow Pain  This is a new problem. Episode onset: 6 months ago. The problem occurs intermittently. The problem has been unchanged. Associated symptoms include weakness. Pertinent negatives include no abdominal pain, arthralgias, chest pain, chills, congestion, coughing, fatigue, fever, headaches, joint swelling, myalgias, nausea, numbness or sore throat. The symptoms are aggravated by twisting. She has tried acetaminophen for the symptoms. The treatment provided mild relief. Review of Systems   Constitutional: Negative for activity change, chills, fatigue and fever. HENT: Negative for congestion, ear pain, sinus pressure and sore throat. Eyes: Negative for redness, itching and visual disturbance. Respiratory: Negative for cough and shortness of breath. Cardiovascular: Negative for chest pain and palpitations. Gastrointestinal: Negative for abdominal pain, diarrhea and nausea.    Endocrine: Negative for cold intolerance and heat intolerance. Genitourinary: Negative for dysuria, flank pain and frequency. Musculoskeletal: Negative for arthralgias, back pain, gait problem, joint swelling and myalgias. Skin: Negative for color change. Allergic/Immunologic: Negative for environmental allergies. Neurological: Positive for weakness. Negative for dizziness, numbness and headaches. Psychiatric/Behavioral: Negative for behavioral problems and sleep disturbance. The following portions of the patient's history were reviewed and updated as appropriate : past family history, past medical history, past social history and past surgical history. Current Outpatient Medications:   •  acetaminophen (TYLENOL) 500 mg tablet, Take 500 mg by mouth every 6 (six) hours as needed for mild pain, Disp: , Rfl:   •  cyanocobalamin (VITAMIN B-12) 100 mcg tablet, Take 50 mcg by mouth daily, Disp: , Rfl:   •  fluticasone (FLONASE) 50 mcg/act nasal spray, INSTILL 2 SPRAYS INTO EACH NOSTRIL DAILY AS NEEDED, Disp: , Rfl:   •  Lactobacillus (ACIDOPHILUS) 100 MG CAPS, Take by mouth every morning , Disp: , Rfl:   •  levocetirizine (XYZAL) 5 MG tablet, Take 5 mg by mouth every evening, Disp: , Rfl:   •  NON FORMULARY, as needed OTC allergy medication, Disp: , Rfl:   •  Sennosides (SENOKOT PO), Take by mouth as needed, Disp: , Rfl:   •  VITAMIN D PO, Take by mouth, Disp: , Rfl:   •  Ascorbic Acid (VITAMIN C PO), Take by mouth, Disp: , Rfl:   •  CALCIUM PO, Take by mouth, Disp: , Rfl:   •  ergocalciferol (ERGOCALCIFEROL) 1.25 MG (68994 UT) capsule, Take 50,000 Units by mouth once a week, Disp: , Rfl:   •  VITAMIN E PO, Take 180 mg by mouth every morning, Disp: , Rfl:          Objective:         Vitals:    10/06/23 1441   BP: 130/84   BP Location: Left arm   Patient Position: Sitting   Cuff Size: Large   Pulse: 78   Temp: 97.7 °F (36.5 °C)   SpO2: 99%   Weight: 74.4 kg (164 lb)   Height: 5' 5" (1.651 m)     Physical Exam  Vitals reviewed.    Constitutional: General: She is not in acute distress. Appearance: Normal appearance. She is well-developed. She is not ill-appearing. HENT:      Head: Normocephalic and atraumatic. Right Ear: Hearing and external ear normal.      Left Ear: Hearing and external ear normal.      Nose: Nose normal. No nasal deformity or septal deviation. Eyes:      General: Lids are normal.      Pupils: Pupils are equal, round, and reactive to light. Neck:      Thyroid: No thyromegaly. Trachea: Trachea normal.   Cardiovascular:      Rate and Rhythm: Normal rate. Pulmonary:      Effort: Pulmonary effort is normal. No respiratory distress. Abdominal:      Tenderness: There is no guarding. Musculoskeletal:         General: Normal range of motion. Right elbow: Tenderness present in lateral epicondyle. Cervical back: Normal range of motion and neck supple. No edema or erythema. Skin:     General: Skin is warm and dry. Neurological:      Mental Status: She is alert. Cranial Nerves: No cranial nerve deficit. Sensory: No sensory deficit. Psychiatric:         Speech: Speech normal.         Behavior: Behavior normal. Behavior is cooperative. Thought Content:  Thought content normal.         Judgment: Judgment normal.

## 2023-11-15 ENCOUNTER — PATIENT MESSAGE (OUTPATIENT)
Dept: FAMILY MEDICINE CLINIC | Facility: CLINIC | Age: 43
End: 2023-11-15

## 2023-11-20 ENCOUNTER — TELEPHONE (OUTPATIENT)
Dept: OBGYN CLINIC | Facility: CLINIC | Age: 43
End: 2023-11-20

## 2023-11-20 NOTE — TELEPHONE ENCOUNTER
LVM for pt.her last annual was 11/30/22, it is too soon for pt to come in for yearly, pt needs to resched,unless coming in for a problem visit.

## 2023-11-29 ENCOUNTER — HOSPITAL ENCOUNTER (OUTPATIENT)
Dept: MAMMOGRAPHY | Facility: MEDICAL CENTER | Age: 43
Discharge: HOME | End: 2023-11-29
Payer: COMMERCIAL

## 2023-11-29 VITALS — HEIGHT: 65 IN | BODY MASS INDEX: 27.32 KG/M2 | WEIGHT: 164 LBS

## 2023-11-29 DIAGNOSIS — Z12.31 ENCOUNTER FOR SCREENING MAMMOGRAM FOR MALIGNANT NEOPLASM OF BREAST: ICD-10-CM

## 2023-11-29 PROCEDURE — 77067 SCR MAMMO BI INCL CAD: CPT

## 2023-11-29 PROCEDURE — 77063 BREAST TOMOSYNTHESIS BI: CPT

## 2023-12-01 ENCOUNTER — OFFICE VISIT (OUTPATIENT)
Dept: FAMILY MEDICINE CLINIC | Facility: CLINIC | Age: 43
End: 2023-12-01
Payer: COMMERCIAL

## 2023-12-01 ENCOUNTER — APPOINTMENT (OUTPATIENT)
Dept: RADIOLOGY | Facility: CLINIC | Age: 43
End: 2023-12-01
Payer: COMMERCIAL

## 2023-12-01 VITALS
OXYGEN SATURATION: 97 % | RESPIRATION RATE: 17 BRPM | DIASTOLIC BLOOD PRESSURE: 74 MMHG | WEIGHT: 164 LBS | TEMPERATURE: 98.7 F | BODY MASS INDEX: 27.32 KG/M2 | HEIGHT: 65 IN | HEART RATE: 73 BPM | SYSTOLIC BLOOD PRESSURE: 132 MMHG

## 2023-12-01 DIAGNOSIS — M54.2 CERVICALGIA: Primary | ICD-10-CM

## 2023-12-01 DIAGNOSIS — M54.2 CERVICALGIA: ICD-10-CM

## 2023-12-01 DIAGNOSIS — K76.89 LIVER CYST: ICD-10-CM

## 2023-12-01 PROCEDURE — 72050 X-RAY EXAM NECK SPINE 4/5VWS: CPT

## 2023-12-01 PROCEDURE — 99214 OFFICE O/P EST MOD 30 MIN: CPT | Performed by: FAMILY MEDICINE

## 2023-12-01 RX ORDER — METHOCARBAMOL 500 MG/1
500 TABLET, FILM COATED ORAL
Qty: 30 TABLET | Refills: 0 | Status: SHIPPED | OUTPATIENT
Start: 2023-12-01

## 2023-12-01 NOTE — PROGRESS NOTES
Assessment/Plan:   1. Cervicalgia  Symptoms appear likely secondary to an acute cervical strain. At this time, we will check x-rays to rule out gross abnormalities. She is advised on importance of stretching exercise. She may benefit from a massage regularly. She may continue with Tylenol however will add methocarbamol for symptom relief. Follow-up if any symptoms are persisting.  - XR spine cervical complete 4 or 5 vw non injury; Future  - methocarbamol (ROBAXIN) 500 mg tablet; Take 1 tablet (500 mg total) by mouth daily at bedtime as needed for muscle spasms  Dispense: 30 tablet; Refill: 0    2. Liver cyst  - US abdomen complete; Future           There are no diagnoses linked to this encounter. Subjective:       Chief Complaint   Patient presents with    Neck Pain     Pain and pressure in back of neck      Patient ID: Tracee Morrow is a 38 y.o. female. Neck Pain   This is a chronic problem. The problem occurs intermittently. The problem has been unchanged. The pain is present in the left side. The quality of the pain is described as aching. The pain is at a severity of 5/10. The pain is moderate. The pain is Worse during the day. Pertinent negatives include no chest pain, fever, headaches, numbness, syncope, tingling or weakness. She has tried acetaminophen for the symptoms. The treatment provided mild relief. Review of Systems   Constitutional:  Negative for activity change, chills, fatigue and fever. HENT:  Negative for congestion, ear pain, sinus pressure and sore throat. Eyes:  Negative for redness, itching and visual disturbance. Respiratory:  Negative for cough and shortness of breath. Cardiovascular:  Negative for chest pain, palpitations and syncope. Gastrointestinal:  Negative for abdominal pain, diarrhea and nausea. Endocrine: Negative for cold intolerance and heat intolerance. Genitourinary:  Negative for dysuria, flank pain and frequency.    Musculoskeletal: Positive for neck pain. Negative for arthralgias, back pain, gait problem and myalgias. Skin:  Negative for color change. Allergic/Immunologic: Negative for environmental allergies. Neurological:  Negative for dizziness, tingling, weakness, numbness and headaches. Psychiatric/Behavioral:  Negative for behavioral problems and sleep disturbance. The following portions of the patient's history were reviewed and updated as appropriate : past family history, past medical history, past social history and past surgical history. Current Outpatient Medications:     acetaminophen (TYLENOL) 500 mg tablet, Take 500 mg by mouth every 6 (six) hours as needed for mild pain, Disp: , Rfl:     cyanocobalamin (VITAMIN B-12) 100 mcg tablet, Take 50 mcg by mouth daily, Disp: , Rfl:     fluticasone (FLONASE) 50 mcg/act nasal spray, INSTILL 2 SPRAYS INTO EACH NOSTRIL DAILY AS NEEDED, Disp: , Rfl:     Lactobacillus (ACIDOPHILUS) 100 MG CAPS, Take by mouth every morning , Disp: , Rfl:     levocetirizine (XYZAL) 5 MG tablet, Take 5 mg by mouth every evening, Disp: , Rfl:     NON FORMULARY, as needed OTC allergy medication, Disp: , Rfl:     Sennosides (SENOKOT PO), Take by mouth as needed, Disp: , Rfl:     VITAMIN D PO, Take by mouth, Disp: , Rfl:     Ascorbic Acid (VITAMIN C PO), Take by mouth, Disp: , Rfl:     CALCIUM PO, Take by mouth, Disp: , Rfl:     ergocalciferol (ERGOCALCIFEROL) 1.25 MG (35657 UT) capsule, Take 50,000 Units by mouth once a week, Disp: , Rfl:     VITAMIN E PO, Take 180 mg by mouth every morning, Disp: , Rfl:          Objective:         Vitals:    12/01/23 1039   BP: 132/74   BP Location: Left arm   Patient Position: Sitting   Cuff Size: Standard   Pulse: 73   Resp: 17   Temp: 98.7 °F (37.1 °C)   TempSrc: Temporal   SpO2: 97%   Weight: 74.4 kg (164 lb)   Height: 5' 5" (1.651 m)     Physical Exam  Vitals reviewed. Constitutional:       General: She is not in acute distress.      Appearance: Normal appearance. She is well-developed. She is not ill-appearing. HENT:      Head: Normocephalic and atraumatic. Right Ear: Hearing and external ear normal.      Left Ear: Hearing and external ear normal.      Nose: Nose normal. No nasal deformity or septal deviation. Mouth/Throat:      Mouth: Mucous membranes are moist.      Pharynx: Oropharynx is clear. Eyes:      General: Lids are normal.      Extraocular Movements: Extraocular movements intact. Conjunctiva/sclera: Conjunctivae normal.      Pupils: Pupils are equal, round, and reactive to light. Neck:      Thyroid: No thyromegaly. Trachea: Trachea normal.   Cardiovascular:      Rate and Rhythm: Normal rate. Pulmonary:      Effort: Pulmonary effort is normal. No respiratory distress. Abdominal:      General: There is no distension. Tenderness: There is no guarding. Musculoskeletal:      Cervical back: Neck supple. Spasms present. No edema or erythema. No pain with movement. Decreased range of motion. Skin:     General: Skin is warm and dry. Neurological:      Mental Status: She is alert. Cranial Nerves: No cranial nerve deficit. Sensory: No sensory deficit. Psychiatric:         Speech: Speech normal.         Behavior: Behavior normal. Behavior is cooperative. Thought Content:  Thought content normal.         Judgment: Judgment normal.

## 2024-01-15 ENCOUNTER — ANNUAL EXAM (OUTPATIENT)
Dept: OBGYN CLINIC | Facility: CLINIC | Age: 44
End: 2024-01-15
Payer: COMMERCIAL

## 2024-01-15 VITALS
BODY MASS INDEX: 26.52 KG/M2 | HEIGHT: 65 IN | DIASTOLIC BLOOD PRESSURE: 62 MMHG | SYSTOLIC BLOOD PRESSURE: 134 MMHG | WEIGHT: 159.2 LBS

## 2024-01-15 DIAGNOSIS — Z01.419 ENCOUNTER FOR WELL WOMAN EXAM WITH ROUTINE GYNECOLOGICAL EXAM: Primary | ICD-10-CM

## 2024-01-15 DIAGNOSIS — Z12.11 SCREENING FOR COLON CANCER: ICD-10-CM

## 2024-01-15 DIAGNOSIS — Z12.4 PAP SMEAR FOR CERVICAL CANCER SCREENING: ICD-10-CM

## 2024-01-15 DIAGNOSIS — Z12.31 ENCOUNTER FOR SCREENING MAMMOGRAM FOR MALIGNANT NEOPLASM OF BREAST: ICD-10-CM

## 2024-01-15 DIAGNOSIS — Z20.2 POSSIBLE EXPOSURE TO STD: ICD-10-CM

## 2024-01-15 PROCEDURE — 87591 N.GONORRHOEAE DNA AMP PROB: CPT | Performed by: OBSTETRICS & GYNECOLOGY

## 2024-01-15 PROCEDURE — G0145 SCR C/V CYTO,THINLAYER,RESCR: HCPCS | Performed by: OBSTETRICS & GYNECOLOGY

## 2024-01-15 PROCEDURE — G0476 HPV COMBO ASSAY CA SCREEN: HCPCS | Performed by: OBSTETRICS & GYNECOLOGY

## 2024-01-15 PROCEDURE — 87491 CHLMYD TRACH DNA AMP PROBE: CPT | Performed by: OBSTETRICS & GYNECOLOGY

## 2024-01-15 PROCEDURE — S0612 ANNUAL GYNECOLOGICAL EXAMINA: HCPCS | Performed by: OBSTETRICS & GYNECOLOGY

## 2024-01-15 NOTE — PROGRESS NOTES
[facility] [id number], [address], [place]    ASSESSMENT/PLAN: Beata Santillan is a 43 y.o.  who presents for annual gynecologic exam.    Encounter for routine gynecologic examination  - Routine well woman exam completed today.  - Cervical Cancer Screening: Current ASCCP Guidelines reviewed. Last Pap: 2022 . Next Pap Due: today; annual pap smears; prior abnormal  - STI screening offered including HIV: ordered  - Contraceptive counseling discussed.  Current contraception: tubal   - Breast Cancer Screening: Last Mammogram 2023, mammo ordered  -Prior ablation, doing well     Additional problems addressed at this visit:  1. Encounter for screening mammogram for malignant neoplasm of breast  -     Mammo screening bilateral w 3d & cad; Future; Expected date: 2024    2. Pap smear for cervical cancer screening  -     Liquid-based pap, screening    3. Possible exposure to STD  -     HIV 1/2 AG/AB w Reflex [facility] for 2 yr old and above; Future  -     RPR-Syphilis Screening (Total Syphilis IGG/IGM); Future  -     Hepatitis B surface antigen; Future  -     Chlamydia/GC amplified DNA by PCR    4. Screening for colon cancer  -     Cologuard          CC:  Annual Gynecologic Examination    HPI: Beata Santillan is a 43 y.o.  who presents for annual gynecologic examination.  Gynecologic Exam      She reports  no new changes to her health.  She reports no breast concerns. She does not get regular periods as she had prior ablation. She has no vaginal discharge, vulvar or vaginal lesions, pelvic pain, or abnormal bleeding.      The following portions of the patient's history were reviewed and updated as appropriate: allergies, current medications, past family history, past medical history, obstetric history, gynecologic history, past social history, past surgical history and problem list.    Review of Systems   Constitutional: Negative.    HENT: Negative.     Eyes:  "Negative.    Respiratory: Negative.     Cardiovascular: Negative.    Gastrointestinal: Negative.    Genitourinary: Negative.    Musculoskeletal: Negative.    All other systems reviewed and are negative.        Objective:  /62   Ht 5' 5\" (1.651 m)   Wt 72.2 kg (159 lb 3.2 oz)   BMI 26.49 kg/m²    Physical Exam  Vitals reviewed.   Constitutional:       General: She is not in acute distress.     Appearance: She is well-developed.   HENT:      Head: Normocephalic and atraumatic.      Nose: Nose normal.   Cardiovascular:      Rate and Rhythm: Normal rate.   Pulmonary:      Effort: Pulmonary effort is normal. No respiratory distress.   Chest:   Breasts:     Breasts are symmetrical.      Right: Normal. No mass, nipple discharge, skin change or tenderness.      Left: Normal. No mass, nipple discharge, skin change or tenderness.   Abdominal:      General: There is no distension.      Palpations: Abdomen is soft. There is no mass.      Tenderness: There is no abdominal tenderness. There is no guarding or rebound.   Genitourinary:     General: Normal vulva.      Exam position: Lithotomy position.      Labia:         Right: No lesion.         Left: No lesion.       Urethra: No prolapse (urethral meatus normal).      Vagina: Normal. No vaginal discharge, erythema or bleeding.      Cervix: Normal.      Uterus: Normal.       Adnexa: Right adnexa normal and left adnexa normal.   Musculoskeletal:         General: Normal range of motion.      Cervical back: Normal range of motion.   Lymphadenopathy:      Upper Body:      Right upper body: No supraclavicular, axillary or pectoral adenopathy.      Left upper body: No supraclavicular, axillary or pectoral adenopathy.      Lower Body: No right inguinal adenopathy. No left inguinal adenopathy.   Skin:     General: Skin is warm and dry.   Neurological:      Mental Status: She is alert and oriented to person, place, and time.   Psychiatric:         Behavior: Behavior normal.        "  Thought Content: Thought content normal.         Judgment: Judgment normal.             Vijaya Perez MD  [facility]  01/15/24 4:09 PM

## 2024-01-16 LAB
HPV HR 12 DNA CVX QL NAA+PROBE: POSITIVE
HPV16 DNA CVX QL NAA+PROBE: NEGATIVE
HPV18 DNA CVX QL NAA+PROBE: NEGATIVE

## 2024-01-17 LAB
C TRACH DNA SPEC QL NAA+PROBE: NEGATIVE
N GONORRHOEA DNA SPEC QL NAA+PROBE: NEGATIVE

## 2024-01-18 NOTE — RESULT ENCOUNTER NOTE
Patient Education     Eating a High-Fiber Diet  Fiber is what gives strength and structure to plants. Most grains, beans, vegetables, and fruits contain fiber. Foods rich in fiber are often low in calories and fat, and they fill you up more. They may also reduce your risks for certain health problems. To find out the amount of fiber in canned, packaged, or frozen foods, read the Nutrition Facts label. It tells you how much fiber is in one serving.    Types of fiber and their benefits  There are two types of fiber: insoluble and soluble. They both aid digestion and help you maintain a healthy weight.  · Insoluble fiber. This is found in whole grains, cereals, certain fruits and vegetables such as apple skin, corn, and carrots. Insoluble fiber may prevent constipation and reduce the risk for certain types of cancer. It is called insoluble because it does not dissolve in water.  · Soluble fiber. This type of fiber is in oats, beans, and certain fruits and vegetables such as strawberries and peas. Soluble fiber can reduce cholesterol, which may help lower the risk for heart disease. It also helps control blood sugar levels.  Look for high-fiber foods  Try these foods to add fiber to your diet:  · Whole-grain breads and cereals. Try to eat 6 to 8 ounces a day. Include wheat and oat bran cereals, whole-wheat muffins or toast, and corn tortillas in your meals.  · Fruits. Try to eat 2 cups a day. Apples, oranges, strawberries, pears, and bananas are good sources. (Note: Fruit juice is low in fiber.)  · Vegetables. Try to eat at least 2.5 cups a day. Add asparagus, carrots, broccoli, peas, and corn to your meals.  · Beans. One cup of cooked lentils gives you over 15 grams of fiber. Try navy beans, lentils, and chickpeas.  · Seeds. A small handful of seeds gives you about 3 grams of fiber. Try sunflower or fay seeds.  Keep track of your fiber  Keep track of how much fiber you eat. Start by reading food labels. Then eat a  Patient notified of results of gg/chlamydia via [employer].  variety of foods high in fiber. As you start to eat more fiber, ask your healthcare provider how much water you should be drinking to keep your digestive system working smoothly.  Aim for a certain amount of fiber in your diet each day. If you are a woman, that amount is between 25 and 28 grams per day. Men should aim for 30 to 33 grams per day. After age 50, your daily fiber needs drop to 22 grams for women and 28 grams for men.  Before you reach for the fiber supplements, think about this. Fiber is found naturally in healthy whole foods. It gives you that feeling of fullness after you eat. Taking fiber supplements or eating fiber-enriched foods will not give you this full feeling.  Your fiber intake is a good measure for the quality of your overall diet. If you are missing out on your daily amount of fiber, you may be lacking other important nutrients as well.  Date Last Reviewed: 6/1/2017 © 2000-2018 [employer]. [address] [place] [address]. All rights reserved. This information is not intended as a substitute for professional medical care. Always follow your healthcare professional's instructions.           Patient Education     High-Fiber Diet  Fiber is in fruits, vegetables, cereals, and grains. Fiber passes through your body undigested. A high-fiber diet helps food move through your intestinal tract. The added bulk is helpful in preventing constipation. In people with diverticulosis, fiber helps clean out the pouches along the colon wall. It also prevents new pouches from forming. A high-fiber diet reduces the risk of colon cancer. It also lowers blood cholesterol and prevents high blood sugar in people with diabetes.    The fiber-rich foods listed below should be part of your diet. If you are not used to high-fiber foods, start with 1 or 2 foods from this list. Every 3 to 4 days add a new one to your diet. Do this until you are eating 4 high-fiber foods per day. This should give  you 20 to 35 grams of fiber a day. It is also important to drink a lot of water when you are on this diet. You should have 6 to 8 glasses of water a day. Water makes the fiber swell and increases the benefit.  Foods high in dietary fiber  The following foods are high in dietary fiber:  · Breads. Breads made with 100% whole-wheat flour; cristobal, wheat, or rye crackers; whole-grain tortillas, bran muffins.  · Cereals. Whole-grain and bran cereals with bran (shredded wheat, wheat flakes, raisin bran, corn bran); oatmeal, rolled oats, granola, and brown rice.  · Fruits. Fresh fruits and their edible skins (pears, prunes, raisins, berries, apples, and apricots); bananas, citrus fruit, mangoes, pineapple; and prune juice.  · Nuts. Any nuts and seeds.  · Vegetables. Best served raw or lightly cooked. All types, especially: green peas, celery, eggplant, potatoes, spinach, broccoli, Maceo sprouts, winter squash, carrots, cauliflower, soybeans, lentils, and fresh and dried beans of all kinds.  · Other. Popcorn, any spices.  Date Last Reviewed: 8/1/2016  © 9344-6183 [employer]. [address] [place] [address]. All rights reserved. This information is not intended as a substitute for professional medical care. Always follow your healthcare professional's instructions.           Patient Education     Discharge Instructions: Eating a High-Fiber Diet    Your healthcare provider has prescribed a high-fiber diet for you. Fiber is what gives strength and structure to plants. Most grains, beans, vegetables, and fruits contain fiber. Foods rich in fiber are often low in calories and fat, but they fill you up more. These foods may also reduce the risk of certain health problems.  There are two types of fiber:  · Insoluble fiber. This is found in whole-grains, cereals, and certain fruits and vegetables (such as apple skins, corn, and beans). Insoluble fiber is made up mainly of plant cell walls. It may prevent  constipation and reduce the risk of certain types of cancer.  · Soluble fiber. This type of fiber is found in oats, beans, nuts, and certain fruits and vegetables (such as strawberries and peas). Soluble fiber turns to gel in the digestive system, slowing the movement of the digestive tract. It helps control blood sugar levels and can reduce cholesterol, which may help lower the risk of heart disease. Soluble fiber can also help control appetite.   Home care  · Know how much fiber you need a day. The recommended daily amount of fiber is 25 grams for women and 38 grams for men. After age 50, daily fiber needs drop to 21 grams for women and 30 grams for men.  · Ask your healthcare provider about a fiber supplement. (Always take fiber supplements with a large glass of water.)  · Keep track of how much fiber you eat.  · Eat a variety of foods high in fiber.  · Learn to read and understand food labels.  · Ask your healthcare provider how much water you should be drinking.  · Look for these high-fiber foods:  ? Whole-grain breads and cereals  § 6 ounces a day give you about 18 grams of fiber (1 ounce is equal to 1 slice of bread, 1 cup of dry cereal, or 1/2 cup of cooked rice).  § Include wheat and oat bran cereals, whole-wheat muffins or toast, and corn tortillas in your meals.  ? Fruits   § 2 cups a day give you about 8 grams of fiber.  § Apples, oranges, strawberries, pears, and bananas are good sources.  § Fruit juice does not contain as much fiber as the fruit it was made from.  ? Vegetables  § 2½ cups a day give you about 11 grams of fiber. Add asparagus, carrots, broccoli, peas, and corn to your meals.  ? Legumes  § 1/4 cup a day (in place of meat) gives you about 4 grams of fiber. Try navy beans, lentils, chickpeas, and soybeans.  ? Seeds   § A small handful of seeds gives you about 3 grams of fiber. Try sunflower seeds.  Follow-up  Make a follow-up appointment, or as advised. Ask your healthcare provider if  seeing a registered dietitian may help you plan a high fiber diet.  Date Last Reviewed: 6/1/2017 © 2000-2018 [employer]. [address] [place] [address], [place] [address]. All rights reserved. This information is not intended as a substitute for professional medical care. Always follow your healthcare professional's instructions.           Patient Education     Understanding Colon and Rectal Polyps    The colon (also called the large intestine) is a muscular tube that forms the last part of the digestive tract. It absorbs water and stores food waste. The colon is about 4 to 6 feet long. The rectum is the last 6 inches of the colon. The colon and rectum have a smooth lining composed of millions of cells. Changes in these cells can lead to growths in the colon that can become cancerous and should be removed. Multiple tests are available to screen for colon cancer, but the colonoscopy is the most recommended test. During colonoscopy, these polyps can be removed. How often you need this test depends on many things including your condition, your family history, symptoms, and what the findings were at the previous colonoscopy.   When the colon lining changes  Changes that happen in the cells that line the colon or rectum can lead to growths called polyps. Over a period of years, polyps can turn cancerous. Removing polyps early may prevent cancer from ever forming.  Polyps  Polyps are fleshy clumps of tissue that form on the lining of the colon or rectum. Small polyps are usually benign (not cancerous). However, over time, cells in a polyp can change and become cancerous. Certain types of polyps known as adenomatous polyps are premalignant. The risk for invasive cancer increases with the size of the polyp and certain cell and gene features. This means that they can become cancerous if they're not removed. Hyperplastic polyps are benign. They can grow quite large and not turn cancerous.   Cancer  Almost all  colorectal cancers start when polyp cells begin growing abnormally. As a cancerous tumor grows, it may involve more and more of the colon or rectum. In time, cancer can also grow beyond the colon or rectum and spread to nearby organs or to glands called lymph nodes. The cells can also travel to other parts of the body. This is known as metastasis. The earlier a cancerous tumor is removed, the better the chance of preventing its spread.    Date Last Reviewed: 8/1/2016  © 3310-7348 [employer]. [address] [place] [id number]. All rights reserved. This information is not intended as a substitute for professional medical care. Always follow your healthcare professional's instructions.

## 2024-01-19 LAB
LAB AP GYN PRIMARY INTERPRETATION: NORMAL
Lab: NORMAL
PATH INTERP SPEC-IMP: NORMAL

## 2024-01-26 ENCOUNTER — HOSPITAL ENCOUNTER (OUTPATIENT)
Dept: ULTRASOUND IMAGING | Facility: HOSPITAL | Age: 44
Discharge: HOME | End: 2024-01-26
Payer: COMMERCIAL

## 2024-01-26 ENCOUNTER — APPOINTMENT (OUTPATIENT)
Dept: LAB | Facility: HOSPITAL | Age: 44
End: 2024-01-26
Payer: COMMERCIAL

## 2024-01-26 DIAGNOSIS — Z20.2 POSSIBLE EXPOSURE TO STD: ICD-10-CM

## 2024-01-26 DIAGNOSIS — K76.89 LIVER CYST: ICD-10-CM

## 2024-01-26 LAB
HBV SURFACE AG SER QL: NORMAL
HIV 1+2 AB+HIV1 P24 AG SERPL QL IA: NORMAL
HIV 2 AB SERPL QL IA: NORMAL
HIV1 AB SERPL QL IA: NORMAL
HIV1 P24 AG SERPL QL IA: NORMAL
TREPONEMA PALLIDUM IGG+IGM AB [PRESENCE] IN SERUM OR PLASMA BY IMMUNOASSAY: NORMAL

## 2024-01-26 PROCEDURE — 76700 US EXAM ABDOM COMPLETE: CPT

## 2024-01-26 PROCEDURE — 86780 TREPONEMA PALLIDUM: CPT

## 2024-01-26 PROCEDURE — 87340 HEPATITIS B SURFACE AG IA: CPT

## 2024-01-26 PROCEDURE — 36415 COLL VENOUS BLD VENIPUNCTURE: CPT

## 2024-01-26 PROCEDURE — 87389 HIV-1 AG W/HIV-1&-2 AB AG IA: CPT

## 2024-01-29 ENCOUNTER — TELEPHONE (OUTPATIENT)
Dept: OBGYN CLINIC | Facility: CLINIC | Age: 44
End: 2024-01-29

## 2024-01-29 NOTE — TELEPHONE ENCOUNTER
----- Message from Vijaya Perez MD sent at 1/26/2024  2:49 PM EST -----  Please call Beata and advise results of pap have been reviewed. There is evidence of BV noted on her pap. I usually treat if there are symptoms.  If not, we can monitor.  Also, her HPV is positive. I would recommend repeating the pap and HPV next year to monitor closely. .   Vijaya Perez MD

## 2024-01-29 NOTE — TELEPHONE ENCOUNTER
Per patient communication consent, left detailed message normal cytology on pap, but hpv other positive. Will repeat both pap and hpv at next years yearly. To call office is experiencing any symptoms or BV. (Vaginal discharge, itching, or odor). To call office with any questions or concerns.

## 2024-02-08 LAB — COLOGUARD RESULT REPORTABLE: NEGATIVE

## 2024-02-21 PROBLEM — Z01.419 ROUTINE GYNECOLOGICAL EXAMINATION: Status: RESOLVED | Noted: 2020-07-01 | Resolved: 2024-02-21

## 2024-02-21 PROBLEM — Z01.419 PAPANICOLAOU SMEAR, AS PART OF ROUTINE GYNECOLOGICAL EXAMINATION: Status: RESOLVED | Noted: 2018-03-23 | Resolved: 2024-02-21

## 2024-02-21 PROBLEM — Z01.419 GYNECOLOGIC EXAM NORMAL: Status: RESOLVED | Noted: 2019-03-27 | Resolved: 2024-02-21

## 2024-07-11 ENCOUNTER — OFFICE VISIT (OUTPATIENT)
Dept: FAMILY MEDICINE CLINIC | Facility: CLINIC | Age: 44
End: 2024-07-11
Payer: COMMERCIAL

## 2024-07-11 VITALS
DIASTOLIC BLOOD PRESSURE: 80 MMHG | HEART RATE: 78 BPM | WEIGHT: 163 LBS | SYSTOLIC BLOOD PRESSURE: 126 MMHG | TEMPERATURE: 97.7 F | BODY MASS INDEX: 27.16 KG/M2 | OXYGEN SATURATION: 96 % | HEIGHT: 65 IN

## 2024-07-11 DIAGNOSIS — Z13.29 SCREENING FOR THYROID DISORDER: ICD-10-CM

## 2024-07-11 DIAGNOSIS — Z13.220 SCREENING FOR CHOLESTEROL LEVEL: ICD-10-CM

## 2024-07-11 DIAGNOSIS — R13.12 OROPHARYNGEAL DYSPHAGIA: ICD-10-CM

## 2024-07-11 DIAGNOSIS — Z13.0 SCREENING FOR IRON DEFICIENCY ANEMIA: ICD-10-CM

## 2024-07-11 DIAGNOSIS — N91.2 AMENORRHEA: ICD-10-CM

## 2024-07-11 DIAGNOSIS — Z13.1 SCREENING FOR DIABETES MELLITUS: ICD-10-CM

## 2024-07-11 DIAGNOSIS — M77.11 LATERAL EPICONDYLITIS OF RIGHT ELBOW: Primary | ICD-10-CM

## 2024-07-11 PROCEDURE — 99214 OFFICE O/P EST MOD 30 MIN: CPT | Performed by: FAMILY MEDICINE

## 2024-07-11 NOTE — PROGRESS NOTES
Assessment/Plan:   1. Lateral epicondylitis of right elbow  Symptoms are persisting with her lateral epicondylitis.  At this time, will refer patient to physical therapy for further evaluation.  Continue with ice over this area.  Will minimize any NSAID use as this may be causing gastroesophageal pathology.    2. Oropharyngeal dysphagia  Patient symptoms today.  Its time, is unclear as to exact cause of her symptoms.  Will check barium swallow to further rule out any gross abnormalities.  Will start trial of a PPI for the next 3 to 4 weeks.  Follow-up if any symptoms persist.  - FL barium swallow ROUTINE esophagus; Future    3. Amenorrhea  Unclear as to exact cause of patient's amenorrhea.  At this time, she has been having hot flashes.  Will check FSH LH as well as estradiol.  Will also check her routine blood work.  Follow-up if any symptoms persist.  - Follicle stimulating hormone; Future  - Luteinizing hormone; Future  - Estradiol; Future             Diagnoses and all orders for this visit:    Lateral epicondylitis of right elbow    Oropharyngeal dysphagia  -     FL barium swallow ROUTINE esophagus; Future    Amenorrhea  -     Follicle stimulating hormone; Future  -     Luteinizing hormone; Future  -     Estradiol; Future    Screening for iron deficiency anemia  -     CBC; Future    Screening for diabetes mellitus  -     Comprehensive metabolic panel; Future  -     Hemoglobin A1C; Future    Screening for cholesterol level  -     Lipid Panel with Direct LDL reflex; Future    Screening for thyroid disorder  -     TSH, 3rd generation with Free T4 reflex; Future          Subjective:       Chief Complaint   Patient presents with    Arm Pain     Pt complains of tennis elbow    Fatigue     Complains of always feeling tired    throat problem     Pt states she has noticed when taking pills they easily get stuck and even when drinking water it gets stuck      Patient ID: Beata Santillan is a 43 y.o. female presents today  with a CC of right elbow pain as well as difficulty swallowing.  She has had this problem for the past few months.  She states her elbow pain has not been improving.  She has been able to take anti-inflammatory medications as well as a muscle relaxer for pain relief.  She states that she has been noticing difficulty with swallowing.  She can drink water as well as take her medication however she feels that it is stuck in her throat.  She feels a stretching sensation over her esophagus    HPI    Review of Systems   Constitutional:  Negative for activity change, chills, fatigue and fever.   HENT:  Negative for congestion, ear pain, sinus pressure and sore throat.    Eyes:  Negative for redness, itching and visual disturbance.   Respiratory:  Negative for cough and shortness of breath.    Cardiovascular:  Negative for chest pain and palpitations.   Gastrointestinal:  Negative for abdominal pain, diarrhea and nausea.   Endocrine: Negative for cold intolerance and heat intolerance.   Genitourinary:  Negative for dysuria, flank pain and frequency.   Musculoskeletal:  Negative for arthralgias, back pain, gait problem and myalgias.   Skin:  Negative for color change.   Allergic/Immunologic: Negative for environmental allergies.   Neurological:  Negative for dizziness, numbness and headaches.   Psychiatric/Behavioral:  Negative for behavioral problems and sleep disturbance.        The following portions of the patient's history were reviewed and updated as appropriate : past family history, past medical history, past social history and past surgical history.    Current Outpatient Medications:     acetaminophen (TYLENOL) 500 mg tablet, Take 500 mg by mouth every 6 (six) hours as needed for mild pain, Disp: , Rfl:     cyanocobalamin (VITAMIN B-12) 100 mcg tablet, Take 50 mcg by mouth daily, Disp: , Rfl:     fluticasone (FLONASE) 50 mcg/act nasal spray, INSTILL 2 SPRAYS INTO EACH NOSTRIL DAILY AS NEEDED, Disp: , Rfl:      "levocetirizine (XYZAL) 5 MG tablet, Take 5 mg by mouth every evening, Disp: , Rfl:     methocarbamol (ROBAXIN) 500 mg tablet, Take 1 tablet (500 mg total) by mouth daily at bedtime as needed for muscle spasms, Disp: 30 tablet, Rfl: 0    Sennosides (SENOKOT PO), Take by mouth as needed, Disp: , Rfl:     VITAMIN D PO, Take by mouth, Disp: , Rfl:     Ascorbic Acid (VITAMIN C PO), Take by mouth, Disp: , Rfl:     CALCIUM PO, Take by mouth, Disp: , Rfl:     ergocalciferol (ERGOCALCIFEROL) 1.25 MG (70361 UT) capsule, Take 50,000 Units by mouth once a week, Disp: , Rfl:     Lactobacillus (ACIDOPHILUS) 100 MG CAPS, Take by mouth every morning  (Patient not taking: Reported on 7/11/2024), Disp: , Rfl:     NON FORMULARY, as needed OTC allergy medication (Patient not taking: Reported on 7/11/2024), Disp: , Rfl:     VITAMIN E PO, Take 180 mg by mouth every morning, Disp: , Rfl:          Objective:         Vitals:    07/11/24 1214   BP: 126/80   BP Location: Left arm   Patient Position: Sitting   Cuff Size: Large   Pulse: 78   Temp: 97.7 °F (36.5 °C)   TempSrc: Temporal   SpO2: 96%   Weight: 73.9 kg (163 lb)   Height: 5' 5\" (1.651 m)     Physical Exam  Vitals reviewed.   Constitutional:       General: She is not in acute distress.     Appearance: Normal appearance. She is well-developed.   HENT:      Head: Normocephalic and atraumatic.      Right Ear: Tympanic membrane, ear canal and external ear normal. There is no impacted cerumen.      Left Ear: Tympanic membrane, ear canal and external ear normal. There is no impacted cerumen.      Nose: Nose normal. No congestion or rhinorrhea.      Mouth/Throat:      Mouth: Mucous membranes are moist.      Pharynx: No oropharyngeal exudate or posterior oropharyngeal erythema.   Eyes:      General: No scleral icterus.        Right eye: No discharge.         Left eye: No discharge.      Extraocular Movements: Extraocular movements intact.      Conjunctiva/sclera: Conjunctivae normal.      " Pupils: Pupils are equal, round, and reactive to light.   Neck:      Trachea: No tracheal deviation.   Cardiovascular:      Rate and Rhythm: Normal rate and regular rhythm.      Pulses: Normal pulses.           Dorsalis pedis pulses are 2+ on the right side and 2+ on the left side.        Posterior tibial pulses are 2+ on the right side and 2+ on the left side.      Heart sounds: Normal heart sounds. No murmur heard.     No friction rub. No gallop.   Pulmonary:      Effort: Pulmonary effort is normal. No respiratory distress.      Breath sounds: Normal breath sounds. No wheezing, rhonchi or rales.   Abdominal:      General: Bowel sounds are normal. There is no distension.      Palpations: Abdomen is soft.      Tenderness: There is no abdominal tenderness. There is no guarding or rebound.   Musculoskeletal:         General: Normal range of motion.      Cervical back: Normal range of motion and neck supple.      Right lower leg: No edema.      Left lower leg: No edema.   Lymphadenopathy:      Head:      Right side of head: No submental or submandibular adenopathy.      Left side of head: No submental or submandibular adenopathy.      Cervical: No cervical adenopathy.      Right cervical: No superficial, deep or posterior cervical adenopathy.     Left cervical: No superficial, deep or posterior cervical adenopathy.   Skin:     General: Skin is warm and dry.      Findings: No erythema.   Neurological:      General: No focal deficit present.      Mental Status: She is alert and oriented to person, place, and time.      Cranial Nerves: No cranial nerve deficit.      Sensory: Sensation is intact. No sensory deficit.      Motor: Motor function is intact.   Psychiatric:         Attention and Perception: Attention and perception normal.         Mood and Affect: Mood is not anxious or depressed.         Speech: Speech normal.         Behavior: Behavior normal.         Thought Content: Thought content normal.         Judgment:  Judgment normal.

## 2024-07-19 ENCOUNTER — APPOINTMENT (OUTPATIENT)
Dept: LAB | Facility: HOSPITAL | Age: 44
End: 2024-07-19
Payer: COMMERCIAL

## 2024-07-19 ENCOUNTER — HOSPITAL ENCOUNTER (OUTPATIENT)
Dept: RADIOLOGY | Facility: HOSPITAL | Age: 44
Discharge: HOME | End: 2024-07-19
Payer: COMMERCIAL

## 2024-07-19 DIAGNOSIS — Z13.0 SCREENING FOR IRON DEFICIENCY ANEMIA: ICD-10-CM

## 2024-07-19 DIAGNOSIS — R13.12 OROPHARYNGEAL DYSPHAGIA: Primary | ICD-10-CM

## 2024-07-19 DIAGNOSIS — Z13.29 SCREENING FOR THYROID DISORDER: ICD-10-CM

## 2024-07-19 DIAGNOSIS — Z13.1 SCREENING FOR DIABETES MELLITUS: ICD-10-CM

## 2024-07-19 DIAGNOSIS — N91.2 AMENORRHEA: ICD-10-CM

## 2024-07-19 DIAGNOSIS — R13.12 OROPHARYNGEAL DYSPHAGIA: ICD-10-CM

## 2024-07-19 DIAGNOSIS — Z13.220 SCREENING FOR CHOLESTEROL LEVEL: ICD-10-CM

## 2024-07-19 LAB
ALBUMIN SERPL BCG-MCNC: 4.2 G/DL (ref 3.5–5)
ALP SERPL-CCNC: 40 U/L (ref 34–104)
ALT SERPL W P-5'-P-CCNC: 8 U/L (ref 7–52)
ANION GAP SERPL CALCULATED.3IONS-SCNC: 4 MMOL/L (ref 4–13)
AST SERPL W P-5'-P-CCNC: 11 U/L (ref 13–39)
BILIRUB SERPL-MCNC: 0.67 MG/DL (ref 0.2–1)
BUN SERPL-MCNC: 13 MG/DL (ref 5–25)
CALCIUM SERPL-MCNC: 8.9 MG/DL (ref 8.4–10.2)
CHLORIDE SERPL-SCNC: 106 MMOL/L (ref 96–108)
CHOLEST SERPL-MCNC: 181 MG/DL
CO2 SERPL-SCNC: 28 MMOL/L (ref 21–32)
CREAT SERPL-MCNC: 0.79 MG/DL (ref 0.6–1.3)
ERYTHROCYTE [DISTWIDTH] IN BLOOD BY AUTOMATED COUNT: 13.2 % (ref 11.6–15.1)
EST. AVERAGE GLUCOSE BLD GHB EST-MCNC: 105 MG/DL
ESTRADIOL SERPL-MCNC: 129.9 PG/ML
FSH SERPL-ACNC: 3.6 MIU/ML
GFR SERPL CREATININE-BSD FRML MDRD: 90 ML/MIN/1.73SQ M
GLUCOSE P FAST SERPL-MCNC: 93 MG/DL (ref 65–99)
HBA1C MFR BLD: 5.3 %
HCT VFR BLD AUTO: 41.1 % (ref 34.8–46.1)
HDLC SERPL-MCNC: 57 MG/DL
HGB BLD-MCNC: 13.7 G/DL (ref 11.5–15.4)
LDLC SERPL CALC-MCNC: 96 MG/DL (ref 0–100)
LH SERPL-ACNC: 3.5 MIU/ML
MCH RBC QN AUTO: 29.5 PG (ref 26.8–34.3)
MCHC RBC AUTO-ENTMCNC: 33.3 G/DL (ref 31.4–37.4)
MCV RBC AUTO: 89 FL (ref 82–98)
PLATELET # BLD AUTO: 287 THOUSANDS/UL (ref 149–390)
PMV BLD AUTO: 10.3 FL (ref 8.9–12.7)
POTASSIUM SERPL-SCNC: 3.9 MMOL/L (ref 3.5–5.3)
PROT SERPL-MCNC: 7 G/DL (ref 6.4–8.4)
RBC # BLD AUTO: 4.64 MILLION/UL (ref 3.81–5.12)
SODIUM SERPL-SCNC: 138 MMOL/L (ref 135–147)
TRIGL SERPL-MCNC: 140 MG/DL
TSH SERPL DL<=0.05 MIU/L-ACNC: 0.97 UIU/ML (ref 0.45–4.5)
WBC # BLD AUTO: 6.55 THOUSAND/UL (ref 4.31–10.16)

## 2024-07-19 PROCEDURE — 82670 ASSAY OF TOTAL ESTRADIOL: CPT

## 2024-07-19 PROCEDURE — 85027 COMPLETE CBC AUTOMATED: CPT

## 2024-07-19 PROCEDURE — 80061 LIPID PANEL: CPT

## 2024-07-19 PROCEDURE — 80053 COMPREHEN METABOLIC PANEL: CPT

## 2024-07-19 PROCEDURE — 83002 ASSAY OF GONADOTROPIN (LH): CPT

## 2024-07-19 PROCEDURE — 84443 ASSAY THYROID STIM HORMONE: CPT

## 2024-07-19 PROCEDURE — 74220 X-RAY XM ESOPHAGUS 1CNTRST: CPT

## 2024-07-19 PROCEDURE — 83036 HEMOGLOBIN GLYCOSYLATED A1C: CPT

## 2024-07-19 PROCEDURE — 83001 ASSAY OF GONADOTROPIN (FSH): CPT

## 2024-07-19 PROCEDURE — 36415 COLL VENOUS BLD VENIPUNCTURE: CPT

## 2024-07-23 ENCOUNTER — TELEPHONE (OUTPATIENT)
Dept: GASTROENTEROLOGY | Facility: CLINIC | Age: 44
End: 2024-07-23

## 2024-07-23 ENCOUNTER — CONSULT (OUTPATIENT)
Dept: GASTROENTEROLOGY | Facility: CLINIC | Age: 44
End: 2024-07-23
Payer: COMMERCIAL

## 2024-07-23 VITALS
DIASTOLIC BLOOD PRESSURE: 81 MMHG | TEMPERATURE: 98 F | OXYGEN SATURATION: 99 % | HEART RATE: 78 BPM | SYSTOLIC BLOOD PRESSURE: 128 MMHG | BODY MASS INDEX: 27.32 KG/M2 | HEIGHT: 65 IN | WEIGHT: 164 LBS

## 2024-07-23 DIAGNOSIS — K59.00 CONSTIPATION, UNSPECIFIED CONSTIPATION TYPE: ICD-10-CM

## 2024-07-23 DIAGNOSIS — R13.19 ESOPHAGEAL DYSPHAGIA: Primary | ICD-10-CM

## 2024-07-23 PROCEDURE — 99244 OFF/OP CNSLTJ NEW/EST MOD 40: CPT | Performed by: DIETITIAN, REGISTERED

## 2024-07-23 RX ORDER — OMEPRAZOLE 20 MG/1
20 CAPSULE, DELAYED RELEASE ORAL DAILY
Qty: 30 CAPSULE | Refills: 3 | Status: SHIPPED | OUTPATIENT
Start: 2024-07-23

## 2024-07-23 NOTE — TELEPHONE ENCOUNTER
Procedure: EGD  Date: Aug 9th  Physician performing: Dr. Thornton  Location of procedure:  [facility]  Instructions given to patient: YES  Diabetic: NO  Clearances: NA

## 2024-07-23 NOTE — PROGRESS NOTES
[facility] - Outpatient Consultation New Patient  Beata Santillan 43 y.o. female MRN: [id number]  Encounter: [id number]          ASSESSMENT AND PLAN:    1.  Esophageal dysphagia  Patient presents with 6-12 month history of esophageal dysphagia and coughing while sleeping.  Food/water feel stuck in her throat after eating/drinking, then eventually go down within a few seconds.  She denies any vomiting/regurgitation.  She denies any heartburn or acid reflux.  She has a history of very rare/intermittent epigastric discomfort, maybe 1-2x/year.  She has a good appetite, but does have early satiety.  Denies any nausea or weight loss.  She typically fluctuates 155-163 lb.  Barium esophagram 7/19/2024 showed delayed esophageal emptying and esophagoesophageal reflux while prone.    -Start omeprazole 20 mg once daily 30 minutes before breakfast.  -Recommend EGD for further evaluation.  -Consider esophageal manometry if EGD normal.      2.  Constipation  Patient has a history of constipation, typically has a BM every 3-4 days with sensation of incomplete emptying.  She denies any black/bloody stools or changes in bowel habits.  Only drinks about 24 oz water daily.  CBC, CMP, and TSH normal.  Cologuard negative 1/2024.    -Encouraged patient to increase water intake as able to goal of 64 oz daily.  -If no improvement with increased water intake, can consider medications.  -Advised patient to call the office or send a [employer] message with any questions/concerns or any new/worsening symptoms.    I obtained informed consent from the patient. The risks/benefits/alternatives of the procedure were discussed with the patient. Risks included, but not limited to, infection, bleeding, perforation, injury to organs in the abdomen, missed lesion and incomplete procedure were discussed. Patient was agreeable and electronic signature was obtained.       Follow up 3  months.    ________________________________________________________    HPI:  Beata Santillan is a 43 y.o. female with history of hypertension and enlarged thyroid who presents for evaluation of dysphagia.  Reviewed family medicine note 7/11/2024, at which time patient reported new onset difficulty swallowing.  Barium esophagram 7/19/2024 showed delayed esophageal emptying and esophagoesophageal reflux while prone.    Patient presents with 6-12 month history of esophageal dysphagia and coughing while sleeping.  Food/water feel stuck in her throat after eating/drinking, then eventually go down within a few seconds.  She denies any vomiting/regurgitation.  She denies any heartburn or acid reflux.  She has a history of very rare/intermittent epigastric discomfort, maybe 1-2x/year.  She has a good appetite, but does have early satiety.  Denies any nausea or weight loss.  She typically fluctuates 155-163 lb.    Patient has a history of constipation, typically has a BM every 3-4 days with sensation of incomplete emptying.  She denies any black/bloody stools or changes in bowel habits.  Only drinks about 24 oz water daily.     Cologuard 1/2024 negative.    Answers submitted by the patient for this visit:  Abdominal Pain Questionnaire (Submitted on 7/23/2024)  Chief Complaint: Abdominal pain  anorexia: Yes  arthralgias: Yes  constipation: Yes  headaches: Yes  myalgias: Yes  weight loss: Yes      REVIEW OF SYSTEMS:  10 point ROS reviewed and negative, except as above    Historical Information   Past Medical History:   Diagnosis Date   • Abnormal Pap smear of cervix    • Constipation    • Edema     lower extremities on occ   • History of fall 05/23/2020    feeling of tiredness, slowly passed out-could hear, but couldn't respond   • Hypertension 11/16/2018   • Joint pain     elbows, hips, knees   • Lump of breast, right     benign biopsies   • Lupus (HCC)     went to rheumatologist 7/10/2020 to R/O lupus-blood work not back  yet   • Migraine Over a year ago   • Motion sickness    • Wears glasses     currently not wearing     Past Surgical History:   Procedure Laterality Date   • APPENDECTOMY     • BREAST BIOPSY Right 1995    benign   • BREAST LUMPECTOMY Right 1995   • ENDOMETRIAL ABLATION  08/2008   • CT EXC B9 LESION MRGN XCP SK TG S/N/H/F/G 1.1-2.0CM N/A 7/16/2020    Procedure: EXCISION LESION/MASS SCALP;  Surgeon: Robert Bloch, MD;  Location: [facility];  Service: General   • TUBAL LIGATION  2007     Social History   Social History     Substance and Sexual Activity   Alcohol Use Yes   • Alcohol/week: 1.0 standard drink of alcohol   • Types: 1 Glasses of wine per week    Comment: 3 drinks/wk     Social History     Substance and Sexual Activity   Drug Use Not Currently   • Types: Marijuana     Social History     Tobacco Use   Smoking Status Never   Smokeless Tobacco Never     Family History   Problem Relation Age of Onset   • Diabetes Mother    • Hypertension Mother    • Prostate cancer Father 70   • Diabetes Father    • Hypertension Father    • Colon cancer Father         Prostate Cancer   • Fibroids Sister    • No Known Problems Sister    • No Known Problems Sister    • Hypertension Sister    • Fibroids Sister    • Fibroids Sister    • No Known Problems Daughter    • No Known Problems Daughter    • No Known Problems Maternal Grandmother    • No Known Problems Maternal Grandfather    • No Known Problems Paternal Grandmother    • No Known Problems Paternal Grandfather    • Hypertension Brother    • Hypertension Brother    • Asthma Son    • Asthma Son    • Cancer Paternal Aunt 75        unknown type       Meds/Allergies       Current Outpatient Medications:   •  fluticasone (FLONASE) 50 mcg/act nasal spray  •  methocarbamol (ROBAXIN) 500 mg tablet  •  omeprazole (PriLOSEC) 20 mg delayed release capsule  •  Sennosides (SENOKOT PO)  •  acetaminophen (TYLENOL) 500 mg tablet  •  Ascorbic Acid (VITAMIN C PO)  •  CALCIUM PO  •  cyanocobalamin  (VITAMIN B-12) 100 mcg tablet  •  ergocalciferol (ERGOCALCIFEROL) 1.25 MG (66263 UT) capsule  •  Lactobacillus (ACIDOPHILUS) 100 MG CAPS  •  levocetirizine (XYZAL) 5 MG tablet  •  NON FORMULARY  •  VITAMIN D PO  •  VITAMIN E PO    No Known Allergies        Objective   Wt Readings from Last 3 Encounters:   07/23/24 74.4 kg (164 lb)   07/11/24 73.9 kg (163 lb)   01/15/24 72.2 kg (159 lb 3.2 oz)     Temp Readings from Last 3 Encounters:   07/23/24 98 °F (36.7 °C) (Tympanic)   07/11/24 97.7 °F (36.5 °C) (Temporal)   12/01/23 98.7 °F (37.1 °C) (Temporal)     BP Readings from Last 3 Encounters:   07/23/24 128/81   07/11/24 126/80   01/15/24 134/62     Pulse Readings from Last 3 Encounters:   07/23/24 78   07/11/24 78   12/01/23 73        PHYSICAL EXAM:     Physical Exam  Vitals reviewed.   Constitutional:       General: She is not in acute distress.     Appearance: She is well-developed.   HENT:      Head: Normocephalic and atraumatic.   Eyes:      Conjunctiva/sclera: Conjunctivae normal.   Cardiovascular:      Rate and Rhythm: Normal rate and regular rhythm.      Heart sounds: No murmur heard.  Pulmonary:      Effort: Pulmonary effort is normal. No respiratory distress.      Breath sounds: Normal breath sounds.   Abdominal:      General: Bowel sounds are normal. There is no distension.      Palpations: Abdomen is soft.      Tenderness: There is no abdominal tenderness. There is no guarding.   Musculoskeletal:         General: No swelling.      Cervical back: Neck supple.   Skin:     General: Skin is warm and dry.   Neurological:      Mental Status: She is alert.   Psychiatric:         Mood and Affect: Mood normal.             Lab Results:   No visits with results within 1 Day(s) from this visit.   Latest known visit with results is:   Appointment on 07/19/2024   Component Date Value   • WBC 07/19/2024 6.55    • RBC 07/19/2024 4.64    • Hemoglobin 07/19/2024 13.7    • Hematocrit 07/19/2024 41.1    • MCV 07/19/2024 89    •  MCH 07/19/2024 29.5    • MCHC 07/19/2024 33.3    • RDW 07/19/2024 13.2    • Platelets 07/19/2024 287    • MPV 07/19/2024 10.3    • Sodium 07/19/2024 138    • Potassium 07/19/2024 3.9    • Chloride 07/19/2024 106    • CO2 07/19/2024 28    • ANION GAP 07/19/2024 4    • BUN 07/19/2024 13    • Creatinine 07/19/2024 0.79    • Glucose, Fasting 07/19/2024 93    • Calcium 07/19/2024 8.9    • AST 07/19/2024 11 (L)    • ALT 07/19/2024 8    • Alkaline Phosphatase 07/19/2024 40    • Total Protein 07/19/2024 7.0    • Albumin 07/19/2024 4.2    • Total Bilirubin 07/19/2024 0.67    • eGFR 07/19/2024 90    • Cholesterol 07/19/2024 181    • Triglycerides 07/19/2024 140    • HDL, Direct 07/19/2024 57    • LDL Calculated 07/19/2024 96    • TSH 3RD GENERATON 07/19/2024 0.966    • Hemoglobin A1C 07/19/2024 5.3    • EAG 07/19/2024 105    • FSH 07/19/2024 3.6    • LH 07/19/2024 3.5    • Estradiol 07/19/2024 129.9        Lab Results   Component Value Date    WBC 6.55 07/19/2024    HGB 13.7 07/19/2024    HCT 41.1 07/19/2024    MCV 89 07/19/2024     07/19/2024       Lab Results   Component Value Date    SODIUM 138 07/19/2024    K 3.9 07/19/2024     07/19/2024    CO2 28 07/19/2024    AGAP 4 07/19/2024    BUN 13 07/19/2024    CREATININE 0.79 07/19/2024    GLUC 91 11/03/2023    GLUF 93 07/19/2024    CALCIUM 8.9 07/19/2024    AST 11 (L) 07/19/2024    ALT 8 07/19/2024    ALKPHOS 40 07/19/2024    TP 7.0 07/19/2024    TBILI 0.67 07/19/2024    EGFR 90 07/19/2024         Radiology Results:   FL barium swallow ROUTINE esophagus    Result Date: 7/19/2024  Narrative: BARIUM SWALLOW / ESOPHAGRAM INDICATION: R13.12: Dysphagia, oropharyngeal phase. COMPARISON: None TECHNIQUE: The patient was given effervescent granules and barium by mouth and images of the esophagus were obtained. IMAGES: 110 FLUOROSCOPY TIME: 1.1 min FINDINGS: Normal caliber of the esophagus. Mildly delayed esophageal emptying is noted throughout the exam with contrast passing  into the stomach on subsequent swallows. No evidence of discrete mucosal lesion, ulceration, or evidence of fold thickening. One instance of trace gastroesophageal reflux was observed when the patient was prone. Mild esophagoesophageal reflux was seen when the patient was recumbent. No hiatal hernia.     Impression: Mildly delayed esophageal emptying and esophagoesophageal reflux are noted when the patient is prone. These findings can be seen in the setting of mild dysmotility. One instance of trace gastroesophageal reflux was also seen when the patient was prone. Workstation performed: [id number]

## 2024-07-25 ENCOUNTER — EVALUATION (OUTPATIENT)
Dept: PHYSICAL THERAPY | Facility: CLINIC | Age: 44
End: 2024-07-25
Payer: COMMERCIAL

## 2024-07-25 DIAGNOSIS — M77.11 LATERAL EPICONDYLITIS OF RIGHT ELBOW: Primary | ICD-10-CM

## 2024-07-25 DIAGNOSIS — M77.8 TENDINITIS OF RIGHT TRICEPS: ICD-10-CM

## 2024-07-25 PROCEDURE — 97161 PT EVAL LOW COMPLEX 20 MIN: CPT | Performed by: PHYSICAL THERAPIST

## 2024-07-25 NOTE — PROGRESS NOTES
PT Evaluation     Today's date: 2024  Patient name: Beata Santillan  : 1980  MRN: [id number]  Referring provider: Elina Tamayo DO  Dx:   Encounter Diagnosis     ICD-10-CM    1. Lateral epicondylitis of right elbow  M77.11 Ambulatory Referral to Physical Therapy      2. Tendinitis of right triceps  M77.8                      Assessment    Assessment details: Patient is a 43 y.o. female who  presents with R UE pain that seems to be multifactoral. Exam today points to R distal triceps tendinitis as primary origin with an element of mild lateral epicondylitis Has posterior capsule tightness in R shoulder with ROM loss which is also likely contributing to increased stress at R elbow.    She has functional limitations as a result of impairments.     Patient would benefit from course of skilled physical therapy to address above listed impairments in an effort to improve function.          Understanding of Dx/Px/POC: good     Prognosis: good    Goals  Goals  Short Term Goals:    1) Pain: Decrease pain to 2/10 at worst x 1 continuous week within 3 weeks.  2) ROM: Improve PROM R shoulder IR to at least 55 degrees within 3 weeks.  2) Strength:5/5  wrist/elbow  strength within 3 weeks.   3) Function: Improved FOTO self rated functional score (60@ IE) , patient to note greater ease with ADLs within 3 weeks.    Long Term Goals:    1) Pain:Eliminate R elbow  pain x 1 continuous week within 6 weeks.  2) ROM: Improve PROM R shoulder IR to at least 65 degrees within 6 weeks.  3) Function: Improved FOTO self rated functional score to at least 66, no difficulty with ADLs as they relate to  R elbow within 6 weeks.  4) I with HEP within 6 weeks.      Plan  Patient would benefit from: skilled physical therapy  Planned modality interventions: TENS, cryotherapy, thermotherapy: hydrocollator packs, iontophoresis and low level laser therapy  Other planned modality interventions: all prn    Planned therapy interventions:  stretching, strengthening, therapeutic exercise, home exercise program, manual therapy, massage, joint mobilization, patient education and ADL training    Frequency: 1-2.  Duration in weeks: 6  Treatment plan discussed with: patient    Subjective Evaluation    History of Present Illness  Mechanism of injury: Patient is a 43 y.o. old female who presents for an initial outpatient physical therapy consultation regarding her R elbow pain    Reports pain has been going on for about the past 6 months and it started insidiously.  Initially pain was focused at elbow, now extending up into shoulder  Noting pain and difficulty with dressing, bathing, washing hair.   Pain wakes her up at times.    Using ibuprofen prn.    Recent consult with PCP, referred for course of physical therapy.    Patient Goals  Patient goals for therapy: decreased pain and independence with ADLs/IADLs    Pain  At worst pain ratin    Social Support    Working: computer work.  Hand dominance: right  Exercise history: no regular exercise        Objective     Palpation     Right   Tenderness of the triceps.     Right Elbow Comments  Right triceps: Distal insertion  .     Active Range of Motion     Right Shoulder   Flexion: WFL  Abduction: WFL  External rotation 90°: 85 degreeswith pain  Internal rotation BTB: T8 with pain    Right Elbow   Flexion: WFL  Extension: WFL  Forearm supination: WFL  Forearm pronation: WFL    Right Wrist   Wrist flexion: WFL  Wrist extension: WFl  Radial deviation: WFL  Ulnar deviation: WFL    Passive Range of Motion     Right Shoulder   External rotation 90°: 90 degrees with pain  Internal rotation 90°: 50 degrees with pain    Right Elbow   Flexion: WFL  Extension: WFL  Forearm supination: WFL  Forearm pronation: WFL    Strength/Myotome Testing     Right Shoulder   Normal muscle strength    Right Elbow   Flexion: 5  Extension: 4+  Forearm supination: 5  Forearm pronation: 4+    Right Wrist/Hand   Wrist extension: 5  Wrist  flexion: 5  Radial deviation: 5  Ulnar deviation: 5    Tests     Right Shoulder   Negative empty can.     Left Elbow   Negative valgus stress at 0 degrees, valgus stress at 30 degrees, varus stress at 0 degrees and varus stress at 30 degrees.     Right Elbow   Negative valgus stress at 0 degrees, valgus stress at 30 degrees, varus stress at 0 degrees and varus stress at 30 degrees.     Right Wrist/Hand   Negative resisted middle finger.     General Comments:      Cervical/Thoracic Comments  Cervical screen does not provoke R UE pain, has some upper trapezius tightness bilaterally.       R :65#   L: 65#       Precautions: none      Manuals 7/25/24            Direct contact low level laser therapy R distal triceps insertion 16W  1.5 min  x 2             IASTM R extensor forearm NV            PROM R shoulder IR/ER NV                                      Neuro Re-Ed                                                                                                        Ther Ex             UBE NV            Self R upper trap stretch NV            Tband elbow extension(triceps strengthening) NV            Tband elbow flexion (biceps strengthening) NV                                                                Ther Activity                                       Gait Training                                       Modalities                          IE/HEP RG

## 2024-07-26 ENCOUNTER — ANESTHESIA (OUTPATIENT)
Dept: ANESTHESIOLOGY | Facility: HOSPITAL | Age: 44
End: 2024-07-26

## 2024-07-26 ENCOUNTER — ANESTHESIA EVENT (OUTPATIENT)
Dept: ANESTHESIOLOGY | Facility: HOSPITAL | Age: 44
End: 2024-07-26

## 2024-07-26 ENCOUNTER — APPOINTMENT (OUTPATIENT)
Dept: PHYSICAL THERAPY | Facility: CLINIC | Age: 44
End: 2024-07-26
Payer: COMMERCIAL

## 2024-08-09 ENCOUNTER — HOSPITAL ENCOUNTER (OUTPATIENT)
Dept: GASTROENTEROLOGY | Facility: MEDICAL CENTER | Age: 44
Setting detail: OUTPATIENT SURGERY
End: 2024-08-09
Payer: COMMERCIAL

## 2024-08-09 ENCOUNTER — ANESTHESIA (OUTPATIENT)
Dept: GASTROENTEROLOGY | Facility: MEDICAL CENTER | Age: 44
End: 2024-08-09

## 2024-08-09 ENCOUNTER — APPOINTMENT (OUTPATIENT)
Dept: PHYSICAL THERAPY | Facility: CLINIC | Age: 44
End: 2024-08-09
Payer: COMMERCIAL

## 2024-08-09 ENCOUNTER — ANESTHESIA EVENT (OUTPATIENT)
Dept: GASTROENTEROLOGY | Facility: MEDICAL CENTER | Age: 44
End: 2024-08-09

## 2024-08-09 VITALS
DIASTOLIC BLOOD PRESSURE: 65 MMHG | OXYGEN SATURATION: 100 % | HEART RATE: 77 BPM | SYSTOLIC BLOOD PRESSURE: 109 MMHG | TEMPERATURE: 97.5 F | RESPIRATION RATE: 16 BRPM

## 2024-08-09 DIAGNOSIS — R13.19 ESOPHAGEAL DYSPHAGIA: ICD-10-CM

## 2024-08-09 LAB
EXT PREGNANCY TEST URINE: NEGATIVE
EXT. CONTROL: NORMAL

## 2024-08-09 PROCEDURE — 88305 TISSUE EXAM BY PATHOLOGIST: CPT | Performed by: STUDENT IN AN ORGANIZED HEALTH CARE EDUCATION/TRAINING PROGRAM

## 2024-08-09 PROCEDURE — 43239 EGD BIOPSY SINGLE/MULTIPLE: CPT | Performed by: INTERNAL MEDICINE

## 2024-08-09 PROCEDURE — 81025 URINE PREGNANCY TEST: CPT | Performed by: ANESTHESIOLOGY

## 2024-08-09 RX ORDER — ONDANSETRON 2 MG/ML
4 INJECTION INTRAMUSCULAR; INTRAVENOUS ONCE AS NEEDED
Status: DISCONTINUED | OUTPATIENT
Start: 2024-08-09 | End: 2024-08-13 | Stop reason: HOSPADM

## 2024-08-09 RX ORDER — SODIUM CHLORIDE, SODIUM LACTATE, POTASSIUM CHLORIDE, CALCIUM CHLORIDE 600; 310; 30; 20 MG/100ML; MG/100ML; MG/100ML; MG/100ML
INJECTION, SOLUTION INTRAVENOUS CONTINUOUS PRN
Status: DISCONTINUED | OUTPATIENT
Start: 2024-08-09 | End: 2024-08-09

## 2024-08-09 RX ORDER — SODIUM CHLORIDE 9 MG/ML
125 INJECTION, SOLUTION INTRAVENOUS CONTINUOUS
Status: CANCELLED | OUTPATIENT
Start: 2024-08-09

## 2024-08-09 RX ORDER — SODIUM CHLORIDE 9 MG/ML
125 INJECTION, SOLUTION INTRAVENOUS CONTINUOUS
Status: DISCONTINUED | OUTPATIENT
Start: 2024-08-09 | End: 2024-08-13 | Stop reason: HOSPADM

## 2024-08-09 RX ORDER — PROPOFOL 10 MG/ML
INJECTION, EMULSION INTRAVENOUS AS NEEDED
Status: DISCONTINUED | OUTPATIENT
Start: 2024-08-09 | End: 2024-08-09

## 2024-08-09 RX ORDER — ONDANSETRON 2 MG/ML
4 INJECTION INTRAMUSCULAR; INTRAVENOUS ONCE AS NEEDED
Status: CANCELLED | OUTPATIENT
Start: 2024-08-09

## 2024-08-09 RX ADMIN — PROPOFOL 100 MG: 10 INJECTION, EMULSION INTRAVENOUS at 13:21

## 2024-08-09 RX ADMIN — PROPOFOL 50 MG: 10 INJECTION, EMULSION INTRAVENOUS at 13:25

## 2024-08-09 RX ADMIN — PROPOFOL 50 MG: 10 INJECTION, EMULSION INTRAVENOUS at 13:23

## 2024-08-09 RX ADMIN — SODIUM CHLORIDE, SODIUM LACTATE, POTASSIUM CHLORIDE, AND CALCIUM CHLORIDE: .6; .31; .03; .02 INJECTION, SOLUTION INTRAVENOUS at 13:13

## 2024-08-09 RX ADMIN — PROPOFOL 30 MG: 10 INJECTION, EMULSION INTRAVENOUS at 13:27

## 2024-08-09 RX ADMIN — SODIUM CHLORIDE 125 ML/HR: 0.9 INJECTION, SOLUTION INTRAVENOUS at 13:14

## 2024-08-09 RX ADMIN — PROPOFOL 20 MG: 10 INJECTION, EMULSION INTRAVENOUS at 13:28

## 2024-08-09 NOTE — ANESTHESIA PREPROCEDURE EVALUATION
Procedure:  EGD    Relevant Problems   ANESTHESIA (within normal limits)      CARDIO   (+) Hypertension      /RENAL   (+) Nephrolithiasis      PULMONARY (within normal limits)      Endocrine   (+) Enlarged thyroid        Physical Exam    Airway    Mallampati score: II  TM Distance: >3 FB  Neck ROM: full     Dental   No notable dental hx     Cardiovascular  Rhythm: regular, Rate: normal, Cardiovascular exam normal    Pulmonary  Pulmonary exam normal Breath sounds clear to auscultation    Other Findings  post-pubertal.      Anesthesia Plan  ASA Score- 2     Anesthesia Type- IV sedation with anesthesia with ASA Monitors.         Additional Monitors:     Airway Plan:            Plan Factors-Exercise tolerance (METS): >4 METS.    Chart reviewed.    Patient summary reviewed.    Patient is not a current smoker.              Induction- intravenous.    Postoperative Plan-         Informed Consent- Anesthetic plan and risks discussed with patient.

## 2024-08-09 NOTE — H&P
History and Physical -  [facility]  Beata Santillan 43 y.o. female MRN: [id number]                  HPI: Beata Santillan is a 43 y.o. year old female who presents for EGD for dysphagia.       REVIEW OF SYSTEMS: Per the HPI, and otherwise unremarkable.    Historical Information   Past Medical History:   Diagnosis Date    Abnormal Pap smear of cervix     Constipation     Edema     lower extremities on occ    History of fall 05/23/2020    feeling of tiredness, slowly passed out-could hear, but couldn't respond    Hypertension 11/16/2018    Joint pain     elbows, hips, knees    Lump of breast, right     benign biopsies    Lupus (HCC)     went to rheumatologist 7/10/2020 to R/O lupus-blood work not back yet    Migraine Over a year ago    Motion sickness     Wears glasses     currently not wearing     Past Surgical History:   Procedure Laterality Date    APPENDECTOMY      BREAST BIOPSY Right 1995    benign    BREAST LUMPECTOMY Right 1995    ENDOMETRIAL ABLATION  08/2008    SC EXC B9 LESION MRGN XCP SK TG S/N/H/F/G 1.1-2.0CM N/A 7/16/2020    Procedure: EXCISION LESION/MASS SCALP;  Surgeon: Robert Bloch, MD;  Location: [facility];  Service: General    TUBAL LIGATION  2007     Social History   Social History     Substance and Sexual Activity   Alcohol Use Yes    Alcohol/week: 1.0 standard drink of alcohol    Types: 1 Glasses of wine per week    Comment: 3 drinks/wk     Social History     Substance and Sexual Activity   Drug Use Not Currently    Types: Marijuana     Social History     Tobacco Use   Smoking Status Never   Smokeless Tobacco Never     Family History   Problem Relation Age of Onset    Diabetes Mother     Hypertension Mother     Prostate cancer Father 70    Diabetes Father     Hypertension Father     Colon cancer Father         Prostate Cancer    Fibroids Sister     No Known Problems Sister     No Known Problems Sister     Hypertension Sister     Fibroids Sister     Fibroids Sister     No  Known Problems Daughter     No Known Problems Daughter     No Known Problems Maternal Grandmother     No Known Problems Maternal Grandfather     No Known Problems Paternal Grandmother     No Known Problems Paternal Grandfather     Hypertension Brother     Hypertension Brother     Asthma Son     Asthma Son     Cancer Paternal Aunt 75        unknown type       Meds/Allergies       Current Outpatient Medications:     acetaminophen (TYLENOL) 500 mg tablet    Ascorbic Acid (VITAMIN C PO)    CALCIUM PO    cyanocobalamin (VITAMIN B-12) 100 mcg tablet    ergocalciferol (ERGOCALCIFEROL) 1.25 MG (52151 UT) capsule    fluticasone (FLONASE) 50 mcg/act nasal spray    Lactobacillus (ACIDOPHILUS) 100 MG CAPS    levocetirizine (XYZAL) 5 MG tablet    methocarbamol (ROBAXIN) 500 mg tablet    NON FORMULARY    omeprazole (PriLOSEC) 20 mg delayed release capsule    Sennosides (SENOKOT PO)    VITAMIN D PO    VITAMIN E PO    Current Facility-Administered Medications:     sodium chloride 0.9 % infusion, 125 mL/hr, Intravenous, Continuous    No Known Allergies    Objective           PHYSICAL EXAM    Gen: NAD  Head: NCAT  CV: RRR  CHEST: Clear  ABD: soft, NT/ND  EXT: no edema      ASSESSMENT/PLAN:  This is a 43 y.o. year old female here for EGD, and she is stable and optimized for her procedure.

## 2024-08-13 PROCEDURE — 88305 TISSUE EXAM BY PATHOLOGIST: CPT | Performed by: STUDENT IN AN ORGANIZED HEALTH CARE EDUCATION/TRAINING PROGRAM

## 2024-08-14 ENCOUNTER — TELEPHONE (OUTPATIENT)
Dept: GASTROENTEROLOGY | Facility: MEDICAL CENTER | Age: 44
End: 2024-08-14

## 2024-08-16 ENCOUNTER — OFFICE VISIT (OUTPATIENT)
Dept: PHYSICAL THERAPY | Facility: CLINIC | Age: 44
End: 2024-08-16
Payer: COMMERCIAL

## 2024-08-16 DIAGNOSIS — M77.11 LATERAL EPICONDYLITIS OF RIGHT ELBOW: Primary | ICD-10-CM

## 2024-08-16 DIAGNOSIS — M77.8 TENDINITIS OF RIGHT TRICEPS: ICD-10-CM

## 2024-08-16 PROCEDURE — 97110 THERAPEUTIC EXERCISES: CPT | Performed by: PHYSICAL THERAPIST

## 2024-08-16 PROCEDURE — 97140 MANUAL THERAPY 1/> REGIONS: CPT | Performed by: PHYSICAL THERAPIST

## 2024-08-16 NOTE — PROGRESS NOTES
Daily Note     Today's date: 2024  Patient name: Beata Santillan  : 1980  MRN: [id number]  Referring provider: Elina Tamayo DO  Dx:   Encounter Diagnosis     ICD-10-CM    1. Lateral epicondylitis of right elbow  M77.11       2. Tendinitis of right triceps  M77.8           Start Time: 0951  Stop Time: 1031  Total time in clinic (min): 40 minutes    Subjective: Greater use of R arm with less pain dressing and bathing. Doing HEP about once very other day.      Objective: See treatment diary below.  PROM R shoulder IR improved to 70 degrees from 50 at IE.  Mild tenderness R distal triceps insertion, R extensor forearm.      Assessment: Tolerated treatment well. Patient noting functional gains. Improved shoulder IR ROM should help to decrease stress on elbow      Plan: Continue per plan of care.      Precautions: none      Manuals 24           Direct contact low level laser therapy R distal triceps insertion 16W  1.5 min  x 2  RG  1.5min x 2           IASTM R extensor forearm NV RG           PROM R shoulder IR/ER NV RG                                     Neuro Re-Ed                                                                                                        Ther Ex             UBE NV 3/3           Self R upper trap stretch NV 30sec x 3           Tband elbow extension(triceps strengthening) NV Green  2 x 10           Tband elbow flexion (biceps strengthening) NV Green  2 x 10 : hammer                                                               Ther Activity                                       Gait Training                                       Modalities                          IE/HEP RG

## 2024-08-23 ENCOUNTER — OFFICE VISIT (OUTPATIENT)
Dept: PHYSICAL THERAPY | Facility: CLINIC | Age: 44
End: 2024-08-23
Payer: COMMERCIAL

## 2024-08-23 DIAGNOSIS — M77.8 TENDINITIS OF RIGHT TRICEPS: ICD-10-CM

## 2024-08-23 DIAGNOSIS — M77.11 LATERAL EPICONDYLITIS OF RIGHT ELBOW: Primary | ICD-10-CM

## 2024-08-23 PROCEDURE — 97140 MANUAL THERAPY 1/> REGIONS: CPT | Performed by: PHYSICAL THERAPIST

## 2024-08-23 PROCEDURE — 97110 THERAPEUTIC EXERCISES: CPT | Performed by: PHYSICAL THERAPIST

## 2024-08-23 NOTE — PROGRESS NOTES
"Daily Note     Today's date: 2024  Patient name: Beata Snatillan  : 1980  MRN: [id number]  Referring provider: Elina Tamayo DO  Dx:   No diagnosis found.                 Subjective: Patient reports that she has not experienced pain in the last 3-4 days.  Notes HEP compliance most days.       Objective: See treatment diary below.    (+) TTP of triceps insertion remains but patient notes this to be less intense    Assessment: Patient demonstrates excellent tolerance to manuals and no discomfort present with TE.  Added wrist extensor eccentrics and updated HEP.  Overall, patient is making steady progress toward pain and function goals.      Plan: Continue per plan of care.      Precautions: none      Manuals 24          Direct contact low level laser therapy R distal triceps insertion 16W  1.5 min  x 2  RG  1.5min x 2 15W 2'/2' triceps and wrist extensor compartment          IASTM R extensor forearm NV RG 6'/6'          PROM R shoulder IR/ER NV RG 3x30\" ea.                                    Neuro Re-Ed                                                                                                        Ther Ex             UBE NV 3/3 L2 3'/3'          Self R upper trap stretch NV 30sec x 3 3x30\"          Tband elbow extension(triceps strengthening) NV Green  2 x 10 black 3x10          Tband elbow flexion (biceps strengthening) NV Green  2 x 10 : hammer black 3x10 hammer          Cross body posterior capsule shoulder stretch on R   3x30\"          Wrist extensor eccentric - 2#   2x10                                    Ther Activity                                       Gait Training                                       Modalities                          IE/HEP RG                 "

## 2024-08-28 ENCOUNTER — TELEPHONE (OUTPATIENT)
Dept: GASTROENTEROLOGY | Facility: CLINIC | Age: 44
End: 2024-08-28

## 2024-08-28 NOTE — TELEPHONE ENCOUNTER
LVM, due to provider schedule change patient appointment was reschedule on 11/12/2024 at 2:30 pm, left call back number in case this no convenience for patient

## 2024-08-30 ENCOUNTER — OFFICE VISIT (OUTPATIENT)
Dept: PHYSICAL THERAPY | Facility: CLINIC | Age: 44
End: 2024-08-30
Payer: COMMERCIAL

## 2024-08-30 DIAGNOSIS — M77.11 LATERAL EPICONDYLITIS OF RIGHT ELBOW: Primary | ICD-10-CM

## 2024-08-30 DIAGNOSIS — M77.8 TENDINITIS OF RIGHT TRICEPS: ICD-10-CM

## 2024-08-30 PROCEDURE — 97140 MANUAL THERAPY 1/> REGIONS: CPT | Performed by: PHYSICAL THERAPIST

## 2024-08-30 PROCEDURE — 97110 THERAPEUTIC EXERCISES: CPT | Performed by: PHYSICAL THERAPIST

## 2024-08-30 NOTE — PROGRESS NOTES
Progress  Note     Today's date: 2024  Patient name: Beata Santillan  : 1980  MRN: [id number]  Referring provider: Elina Tamayo DO  Dx:   Encounter Diagnosis     ICD-10-CM    1. Lateral epicondylitis of right elbow  M77.11       2. Tendinitis of right triceps  M77.8                  Assessment     Assessment details: Patient is a 43 y.o. female who  presented to physical therapy initially with  with R UE pain related to  R distal triceps tendinitis as primary origin with an element of mild lateral epicondylitis      Since starting therapy pt has made the following progress towards goals:  1) Decreased pain  2) Improved range of motion  3) Improved strength  4) Improved self rated function.    Making good progress, still with functional limitations as a result of pain.  Recommend continued short course of therapy at a decreased frequency, see updated goals below.       Understanding of Dx/Px/POC: good     Prognosis: good     Goals  Goals  Short Term Goals:     1) Pain: Decrease pain to 2/10 at worst x 1 continuous week within 3 weeks.-met  2) ROM: Improve PROM R shoulder IR to at least 55 degrees within 3 weeks.-met  2) Strength:5/5  wrist/elbow  strength within 3 weeks. -met  3) Function: Improved FOTO self rated functional score (60@ IE) , patient to note greater ease with ADLs within 3 weeks.-met     Long Term Goals:     1) Pain:Eliminate R elbow  pain x 1 continuous week within 6 weeks.-not yet met  2) ROM: Improve PROM R shoulder IR to at least 65 degrees within 6 weeks.-not yet met  3) Function: Improved FOTO self rated functional score to at least 66, no difficulty with ADLs as they relate to  R elbow within 6 weeks.-not yet met  4) I with HEP within 6 weeks.-not yet met        Plan  Patient would benefit from: skilled physical therapy  Planned modality interventions: TENS, cryotherapy, thermotherapy: hydrocollator packs, iontophoresis and low level laser therapy  Other planned modality  "interventions: all prn     Planned therapy interventions: stretching, strengthening, therapeutic exercise, home exercise program, manual therapy, massage, joint mobilization, patient education and ADL training     Frequency: 1-2 more visits over the course of the next 6-8 weeks with focus on HEP.    Treatment plan discussed with: patient     Subjective Evaluation     History of Present Illness  Mechanism of injury: Patient is a 43 y.o. old female who presents for an initial outpatient physical therapy consultation regarding her R elbow pain     Reports pain has been going on for about the past 6 months and it started insidiously.  Initially pain was focused at elbow, now extending up into shoulder  Noting pain and difficulty with dressing, bathing, washing hair.   Pain wakes her up at times.     Using ibuprofen prn.     Recent consult with PCP, referred for course of physical therapy.        Pain  At worst pain ratin      UPDATE 24: Feeling much better. Max 2/10 pain over the past weekend. Feels \"tight,\" today.  When she does get pain tends to occur just proximal to posterior elbow and in extensor forearm(this is where she points).   Finds that the more she does her home exercises, the better she feels.   Most common times for pain are with computer work and in bed.        Patient Goals: making progress towards personal goals.    Patient goals for therapy: decreased pain and independence with ADLs/IADLs  Social Support     Working: computer work.  Hand dominance: right  Exercise history: no regular exercise           Objective      Palpation      Right   Tenderness of the distal  triceps.     Tenderness at common extensor tendon.     Right Elbow Comments  Right triceps: Distal insertion  .      Active Range of Motion      Right Shoulder   Flexion: WFL  Abduction: WFL  External rotation 90°: 85 degreeswith pain  Internal rotation BTB: T7      Right Elbow   Flexion: WFL  Extension: WFL  Forearm supination: " "WFL  Forearm pronation: WFL     Right Wrist   Wrist flexion: WFL  Wrist extension: WFl  Radial deviation: WFL  Ulnar deviation: WFL     Passive Range of Motion      Right Shoulder   External rotation 90°: 90 degrees with pain  Internal rotation 90°: 60 degrees with pain     Right Elbow   Flexion: WFL  Extension: WFL  Forearm supination: WFL  Forearm pronation: WFL     Strength/Myotome Testing      Right Shoulder   Normal muscle strength     Right Elbow   Flexion: 5  Extension: 5  Forearm supination: 5  Forearm pronation: 5     Right Wrist/Hand   Wrist extension: 5  Wrist flexion: 5  Radial deviation: 5  Ulnar deviation: 5     Tests      Right Shoulder   Negative empty can.      Left Elbow   Negative valgus stress at 0 degrees, valgus stress at 30 degrees, varus stress at 0 degrees and varus stress at 30 degrees.      Right Elbow   Negative valgus stress at 0 degrees, valgus stress at 30 degrees, varus stress at 0 degrees and varus stress at 30 degrees.      Right Wrist/Hand   Negative resisted middle finger.      General Comments:        Cervical/Thoracic Comments  Cervical screen does not provoke R UE pain, has some R trapezius tightness bilaterally.          Precautions: none                  Precautions: none      Manuals 7/25/24 8/16 8/23 8/30         Direct contact low level laser therapy R distal triceps insertion 16W  1.5 min  x 2  RG  1.5min x 2 15W 2'/2' triceps and wrist extensor compartment 15W 2 min each distal triceps and common extensor tendon         IASTM R extensor forearm NV RG 6'/6' RG         PROM R shoulder IR/ER NV RG 3x30\" ea. RG         Manual R posterior capsule stretch    RG                      Neuro Re-Ed                                                                                                        Ther Ex             UBE NV 3/3 L2 3'/3' L2  3/3         Self R upper trap stretch NV 30sec x 3 3x30\" 30sec x 3         Tband elbow extension(triceps strengthening) NV Green  2 x 10 " "black 3x10 Black  2 x 10         Tband elbow flexion (biceps strengthening) NV Green  2 x 10 : hammer black 3x10 hammer Black  2 x 10         Cross body posterior capsule shoulder stretch on R   3x30\"          Wrist extensor eccentric - 2#   2x10 3#  2 x 10                                   Ther Activity                                       Gait Training                                       Modalities                          IE/HEP RG                   "

## 2024-09-20 ENCOUNTER — EVALUATION (OUTPATIENT)
Dept: PHYSICAL THERAPY | Facility: CLINIC | Age: 44
End: 2024-09-20
Payer: COMMERCIAL

## 2024-09-20 DIAGNOSIS — M77.11 LATERAL EPICONDYLITIS OF RIGHT ELBOW: ICD-10-CM

## 2024-09-20 DIAGNOSIS — M77.8 TENDINITIS OF RIGHT TRICEPS: Primary | ICD-10-CM

## 2024-09-20 PROCEDURE — 97140 MANUAL THERAPY 1/> REGIONS: CPT | Performed by: PHYSICAL THERAPIST

## 2024-09-20 PROCEDURE — 97110 THERAPEUTIC EXERCISES: CPT | Performed by: PHYSICAL THERAPIST

## 2024-09-20 NOTE — PROGRESS NOTES
"Daily Note     Today's date: 2024  Patient name: Beata Santillan  : 1980  MRN: [id number]  Referring provider: Elina Tamayo DO  Dx:   Encounter Diagnosis     ICD-10-CM    1. Tendinitis of right triceps  M77.8       2. Lateral epicondylitis of right elbow  M77.11                      Subjective: Overall pain has bee mild since last visit. Has done most of her HEP. Grateful that she feels better now than immediately before starting therapy.      Objective: See treatment diary below  Only area of tenderness today at R distal triceps. Mild restriction noted with IASTM in same area.    Assessment: Tolerated treatment well.       Plan: Continue per plan of care.  Focus on HEP with one month follow up planned.       Precautions: none      Manuals 24        Direct contact low level laser therapy R distal triceps insertion 16W  1.5 min  x 2  RG  1.5min x 2 15W 2'/2' triceps and wrist extensor compartment 15W 2 min each distal triceps and common extensor tendon 18 W, 1 in 7 sec x 2 R distal triceps         IASTM R extensor forearm NV RG 6'/6' RG (+) R distal triceps        PROM R shoulder IR/ER NV RG 3x30\" ea. RG RG        Manual R posterior capsule stretch    RG RG                     Neuro Re-Ed                                                                                                        Ther Ex             UBE NV 3/3 L2 3'/3' L2  3/3 L2  3/3        Self R upper trap stretch NV 30sec x 3 3x30\" 30sec x 3         Tband elbow extension(triceps strengthening) NV Green  2 x 10 black 3x10 Black  2 x 10 Black  2 x 10        Tband elbow flexion (biceps strengthening) NV Green  2 x 10 : hammer black 3x10 hammer Black  2 x 10 Black  2 x 10        Cross body posterior capsule shoulder stretch on R   3x30\"          Wrist extensor eccentric - 2#   2x10 3#  2 x 10 3#  2 x 10        Self R triceps stretch     30sec x 3        Self wrist extensor stretch     30sec x 3        Ther " Activity                                       Gait Training                                       Modalities                          IE/HEP RG

## 2024-12-06 ENCOUNTER — HOSPITAL ENCOUNTER (OUTPATIENT)
Dept: MAMMOGRAPHY | Facility: MEDICAL CENTER | Age: 44
Discharge: HOME | End: 2024-12-06
Payer: COMMERCIAL

## 2024-12-06 VITALS — HEIGHT: 65 IN | BODY MASS INDEX: 27.32 KG/M2 | WEIGHT: 164 LBS

## 2024-12-06 DIAGNOSIS — Z12.31 ENCOUNTER FOR SCREENING MAMMOGRAM FOR MALIGNANT NEOPLASM OF BREAST: ICD-10-CM

## 2024-12-06 PROCEDURE — 77067 SCR MAMMO BI INCL CAD: CPT

## 2024-12-06 PROCEDURE — 77063 BREAST TOMOSYNTHESIS BI: CPT

## 2024-12-11 ENCOUNTER — RESULTS FOLLOW-UP (OUTPATIENT)
Dept: OBGYN CLINIC | Facility: CLINIC | Age: 44
End: 2024-12-11

## 2025-01-10 ENCOUNTER — OFFICE VISIT (OUTPATIENT)
Dept: FAMILY MEDICINE CLINIC | Facility: CLINIC | Age: 45
End: 2025-01-10
Payer: COMMERCIAL

## 2025-01-10 VITALS
HEART RATE: 79 BPM | HEIGHT: 65 IN | WEIGHT: 162.6 LBS | DIASTOLIC BLOOD PRESSURE: 74 MMHG | TEMPERATURE: 97.7 F | BODY MASS INDEX: 27.09 KG/M2 | OXYGEN SATURATION: 97 % | SYSTOLIC BLOOD PRESSURE: 118 MMHG

## 2025-01-10 DIAGNOSIS — M54.2 CERVICALGIA: ICD-10-CM

## 2025-01-10 DIAGNOSIS — Z23 ENCOUNTER FOR IMMUNIZATION: ICD-10-CM

## 2025-01-10 DIAGNOSIS — R13.19 ESOPHAGEAL DYSPHAGIA: ICD-10-CM

## 2025-01-10 DIAGNOSIS — Z00.00 ANNUAL PHYSICAL EXAM: Primary | ICD-10-CM

## 2025-01-10 PROCEDURE — 90656 IIV3 VACC NO PRSV 0.5 ML IM: CPT

## 2025-01-10 PROCEDURE — 90472 IMMUNIZATION ADMIN EACH ADD: CPT

## 2025-01-10 PROCEDURE — 99396 PREV VISIT EST AGE 40-64: CPT | Performed by: FAMILY MEDICINE

## 2025-01-10 PROCEDURE — 90471 IMMUNIZATION ADMIN: CPT

## 2025-01-10 PROCEDURE — 90715 TDAP VACCINE 7 YRS/> IM: CPT

## 2025-01-10 PROCEDURE — 99213 OFFICE O/P EST LOW 20 MIN: CPT | Performed by: FAMILY MEDICINE

## 2025-01-10 RX ORDER — METHOCARBAMOL 500 MG/1
500 TABLET, FILM COATED ORAL
Qty: 30 TABLET | Refills: 0 | Status: SHIPPED | OUTPATIENT
Start: 2025-01-10

## 2025-01-10 NOTE — PROGRESS NOTES
Name: Beata Santillan      : 1980      MRN: [id number]  Encounter Provider: Elina Tamayo DO  Encounter Date: 1/10/2025   Encounter department: [facility]  :  Assessment & Plan  Esophageal dysphagia  Stable.  At this time, continue with dietary trigger avoidance as well as her current treatment with omeprazole.  Orders:    omeprazole (PriLOSEC) 20 mg delayed release capsule; Take 1 capsule (20 mg total) by mouth daily    Cervicalgia  Patient symptoms appear stable.  She denies any recent exacerbations.  Continue with her current treatment of methocarbamol as needed.  Follow-up with patient in 6 months.  Orders:    methocarbamol (ROBAXIN) 500 mg tablet; Take 1 tablet (500 mg total) by mouth daily at bedtime as needed for muscle spasms    Annual physical exam         Encounter for immunization    Orders:    TDAP VACCINE GREATER THAN OR EQUAL TO 6YO IM    influenza vaccine preservative-free 0.5 mL IM (Fluzone, Afluria, Fluarix, Flulaval)           History of Present Illness     HPI  Patient is a 44-year-old female presents today for a follow-up on her chronic conditions.  She has esophageal dysphagia, cervicalgia.  She is been taking medications regularly.  She denies adverse reactions with her medications.  Review of Systems   Constitutional:  Negative for activity change, chills, fatigue and fever.   HENT:  Negative for congestion, ear pain, sinus pressure and sore throat.    Eyes:  Negative for redness, itching and visual disturbance.   Respiratory:  Negative for cough and shortness of breath.    Cardiovascular:  Negative for chest pain and palpitations.   Gastrointestinal:  Negative for abdominal pain, diarrhea and nausea.   Endocrine: Negative for cold intolerance and heat intolerance.   Genitourinary:  Negative for dysuria, flank pain and frequency.   Musculoskeletal:  Negative for arthralgias, back pain, gait problem and myalgias.   Skin:  Negative for color change.  "  Allergic/Immunologic: Negative for environmental allergies.   Neurological:  Negative for dizziness, numbness and headaches.   Psychiatric/Behavioral:  Negative for behavioral problems and sleep disturbance.        Objective   /74 (BP Location: Left arm, Patient Position: Sitting, Cuff Size: Adult)   Pulse 79   Temp 97.7 °F (36.5 °C) (Temporal)   Ht 5' 5\" (1.651 m)   Wt 73.8 kg (162 lb 9.6 oz)   SpO2 97%   BMI 27.06 kg/m²      Physical Exam  Vitals reviewed.   Constitutional:       General: She is not in acute distress.     Appearance: Normal appearance. She is well-developed.   HENT:      Head: Normocephalic and atraumatic.      Right Ear: Tympanic membrane, ear canal and external ear normal. There is no impacted cerumen.      Left Ear: Tympanic membrane, ear canal and external ear normal. There is no impacted cerumen.      Nose: Nose normal. No congestion or rhinorrhea.      Mouth/Throat:      Mouth: Mucous membranes are moist.      Pharynx: No oropharyngeal exudate or posterior oropharyngeal erythema.   Eyes:      General: No scleral icterus.        Right eye: No discharge.         Left eye: No discharge.      Extraocular Movements: Extraocular movements intact.      Conjunctiva/sclera: Conjunctivae normal.      Pupils: Pupils are equal, round, and reactive to light.   Neck:      Trachea: No tracheal deviation.   Cardiovascular:      Rate and Rhythm: Normal rate and regular rhythm.      Pulses: Normal pulses.           Dorsalis pedis pulses are 2+ on the right side and 2+ on the left side.        Posterior tibial pulses are 2+ on the right side and 2+ on the left side.      Heart sounds: Normal heart sounds. No murmur heard.     No friction rub. No gallop.   Pulmonary:      Effort: Pulmonary effort is normal. No respiratory distress.      Breath sounds: Normal breath sounds. No wheezing, rhonchi or rales.   Abdominal:      General: Bowel sounds are normal. There is no distension.      Palpations: " Abdomen is soft.      Tenderness: There is no abdominal tenderness. There is no guarding or rebound.   Musculoskeletal:         General: Normal range of motion.      Cervical back: Normal range of motion and neck supple.      Right lower leg: No edema.      Left lower leg: No edema.   Lymphadenopathy:      Head:      Right side of head: No submental or submandibular adenopathy.      Left side of head: No submental or submandibular adenopathy.      Cervical: No cervical adenopathy.      Right cervical: No superficial, deep or posterior cervical adenopathy.     Left cervical: No superficial, deep or posterior cervical adenopathy.   Skin:     General: Skin is warm and dry.      Findings: No erythema.   Neurological:      General: No focal deficit present.      Mental Status: She is alert and oriented to person, place, and time.      Cranial Nerves: No cranial nerve deficit.      Sensory: Sensation is intact. No sensory deficit.      Motor: Motor function is intact.   Psychiatric:         Attention and Perception: Attention and perception normal.         Mood and Affect: Mood is not anxious or depressed.         Speech: Speech normal.         Behavior: Behavior normal.         Thought Content: Thought content normal.         Judgment: Judgment normal.

## 2025-01-10 NOTE — PROGRESS NOTES
Adult Annual Physical  Name: Beata Santillan      : 1980      MRN: [id number]  Encounter Provider: Eilna Tamayo DO  Encounter Date: 1/10/2025   Encounter department: [facility]    Assessment & Plan  Esophageal dysphagia    Orders:    omeprazole (PriLOSEC) 20 mg delayed release capsule; Take 1 capsule (20 mg total) by mouth daily    Cervicalgia    Orders:    methocarbamol (ROBAXIN) 500 mg tablet; Take 1 tablet (500 mg total) by mouth daily at bedtime as needed for muscle spasms    Annual physical exam         Encounter for immunization    Orders:    TDAP VACCINE GREATER THAN OR EQUAL TO 6YO IM      Immunizations and preventive care screenings were discussed with patient today. Appropriate education was printed on patient's after visit summary.    Counseling:  Alcohol/drug use: discussed moderation in alcohol intake, the recommendations for healthy alcohol use, and avoidance of illicit drug use.  Dental Health: discussed importance of regular tooth brushing, flossing, and dental visits.  Injury prevention: discussed safety/seat belts, safety helmets, smoke detectors, carbon monoxide detectors, and smoking near bedding or upholstery.  Sexual health: discussed sexually transmitted diseases, partner selection, use of condoms, avoidance of unintended pregnancy, and contraceptive alternatives.  Exercise: the importance of regular exercise/physical activity was discussed. Recommend exercise 3-5 times per week for at least 30 minutes.          History of Present Illness     Adult Annual Physical:  Patient presents for annual physical.     Diet and Physical Activity:  - Diet/Nutrition: well balanced diet.  - Exercise: no formal exercise.    Depression Screening:  - PHQ-2 Score: 2    General Health:  - Sleep: sleeps well.  - Hearing: normal hearing bilateral ears.  - Vision: no vision problems.  - Dental: regular dental visits.    /GYN Health:  - Follows with GYN: yes.   - History of  STDs: no    Review of Systems   Constitutional:  Negative for activity change, chills, fatigue and fever.   HENT:  Negative for congestion, ear pain, sinus pressure and sore throat.    Eyes:  Negative for redness, itching and visual disturbance.   Respiratory:  Negative for cough and shortness of breath.    Cardiovascular:  Negative for chest pain and palpitations.   Gastrointestinal:  Negative for abdominal pain, diarrhea and nausea.   Endocrine: Negative for cold intolerance and heat intolerance.   Genitourinary:  Negative for dysuria, flank pain and frequency.   Musculoskeletal:  Negative for arthralgias, back pain, gait problem and myalgias.   Skin:  Negative for color change.   Allergic/Immunologic: Negative for environmental allergies.   Neurological:  Negative for dizziness, numbness and headaches.   Psychiatric/Behavioral:  Negative for behavioral problems and sleep disturbance.      Medical History Reviewed by provider this encounter:     .  Current Outpatient Medications on File Prior to Visit   Medication Sig Dispense Refill    acetaminophen (TYLENOL) 500 mg tablet Take 500 mg by mouth every 6 (six) hours as needed for mild pain      fluticasone (FLONASE) 50 mcg/act nasal spray INSTILL 2 SPRAYS INTO EACH NOSTRIL DAILY AS NEEDED      levocetirizine (XYZAL) 5 MG tablet Take 5 mg by mouth every evening      Sennosides (SENOKOT PO) Take by mouth as needed      [DISCONTINUED] methocarbamol (ROBAXIN) 500 mg tablet Take 1 tablet (500 mg total) by mouth daily at bedtime as needed for muscle spasms 30 tablet 0    [DISCONTINUED] omeprazole (PriLOSEC) 20 mg delayed release capsule Take 1 capsule (20 mg total) by mouth daily 30 capsule 3    ergocalciferol (ERGOCALCIFEROL) 1.25 MG (14734 UT) capsule Take 50,000 Units by mouth once a week      [DISCONTINUED] Ascorbic Acid (VITAMIN C PO) Take by mouth      [DISCONTINUED] CALCIUM PO Take by mouth      [DISCONTINUED] cyanocobalamin (VITAMIN B-12) 100 mcg tablet Take 50  "mcg by mouth daily (Patient not taking: Reported on 1/10/2025)      [DISCONTINUED] Lactobacillus (ACIDOPHILUS) 100 MG CAPS Take by mouth every morning  (Patient not taking: Reported on 7/11/2024)      [DISCONTINUED] NON FORMULARY as needed OTC allergy medication (Patient not taking: Reported on 7/11/2024)      [DISCONTINUED] VITAMIN D PO Take by mouth (Patient not taking: Reported on 7/23/2024)      [DISCONTINUED] VITAMIN E PO Take 180 mg by mouth every morning       No current facility-administered medications on file prior to visit.      Social History     Tobacco Use    Smoking status: Never    Smokeless tobacco: Never   Vaping Use    Vaping status: Never Used   Substance and Sexual Activity    Alcohol use: Yes     Alcohol/week: 1.0 standard drink of alcohol     Types: 1 Glasses of wine per week     Comment: 3 drinks/wk    Drug use: Not Currently     Types: Marijuana    Sexual activity: Yes     Partners: Male     Birth control/protection: Condom Male     Comment: pt. requests std testing       Objective   /74 (BP Location: Left arm, Patient Position: Sitting, Cuff Size: Adult)   Pulse 79   Temp 97.7 °F (36.5 °C) (Temporal)   Ht 5' 5\" (1.651 m)   Wt 73.8 kg (162 lb 9.6 oz)   SpO2 97%   BMI 27.06 kg/m²     Physical Exam  Vitals reviewed.   Constitutional:       General: She is not in acute distress.     Appearance: Normal appearance. She is well-developed.   HENT:      Head: Normocephalic and atraumatic.      Right Ear: Tympanic membrane, ear canal and external ear normal. There is no impacted cerumen.      Left Ear: Tympanic membrane, ear canal and external ear normal. There is no impacted cerumen.      Nose: Nose normal. No congestion or rhinorrhea.      Mouth/Throat:      Mouth: Mucous membranes are moist.      Pharynx: No oropharyngeal exudate or posterior oropharyngeal erythema.   Eyes:      General: No scleral icterus.        Right eye: No discharge.         Left eye: No discharge.      " Extraocular Movements: Extraocular movements intact.      Conjunctiva/sclera: Conjunctivae normal.      Pupils: Pupils are equal, round, and reactive to light.   Neck:      Trachea: No tracheal deviation.   Cardiovascular:      Rate and Rhythm: Normal rate and regular rhythm.      Pulses: Normal pulses.           Dorsalis pedis pulses are 2+ on the right side and 2+ on the left side.        Posterior tibial pulses are 2+ on the right side and 2+ on the left side.      Heart sounds: Normal heart sounds. No murmur heard.     No friction rub. No gallop.   Pulmonary:      Effort: Pulmonary effort is normal. No respiratory distress.      Breath sounds: Normal breath sounds. No wheezing, rhonchi or rales.   Abdominal:      General: Bowel sounds are normal. There is no distension.      Palpations: Abdomen is soft.      Tenderness: There is no abdominal tenderness. There is no guarding or rebound.   Musculoskeletal:         General: Normal range of motion.      Cervical back: Normal range of motion and neck supple.      Right lower leg: No edema.      Left lower leg: No edema.   Lymphadenopathy:      Head:      Right side of head: No submental or submandibular adenopathy.      Left side of head: No submental or submandibular adenopathy.      Cervical: No cervical adenopathy.      Right cervical: No superficial, deep or posterior cervical adenopathy.     Left cervical: No superficial, deep or posterior cervical adenopathy.   Skin:     General: Skin is warm and dry.      Findings: No erythema.   Neurological:      General: No focal deficit present.      Mental Status: She is alert and oriented to person, place, and time.      Cranial Nerves: No cranial nerve deficit.      Sensory: Sensation is intact. No sensory deficit.      Motor: Motor function is intact.   Psychiatric:         Attention and Perception: Attention and perception normal.         Mood and Affect: Mood is not anxious or depressed.         Speech: Speech  normal.         Behavior: Behavior normal.         Thought Content: Thought content normal.         Judgment: Judgment normal.

## 2025-01-10 NOTE — PATIENT INSTRUCTIONS
"Patient Education     Routine physical for adults   The Basics   Written by the doctors and editors at [facility]   What is a physical? -- A physical is a routine visit, or \"check-up,\" with your doctor. You might also hear it called a \"wellness visit\" or \"preventive visit.\"  During each visit, the doctor will:   Ask about your physical and mental health   Ask about your habits, behaviors, and lifestyle   Do an exam   Give you vaccines if needed   Talk to you about any medicines you take   Give advice about your health   Answer your questions  Getting regular check-ups is an important part of taking care of your health. It can help your doctor find and treat any problems you have. But it's also important for preventing health problems.  A routine physical is different from a \"sick visit.\" A sick visit is when you see a doctor because of a health concern or problem. Since physicals are scheduled ahead of time, you can think about what you want to ask the doctor.  How often should I get a physical? -- It depends on your age and health. In general, for people age 21 years and older:   If you are younger than 50 years, you might be able to get a physical every 3 years.   If you are 50 years or older, your doctor might recommend a physical every year.  If you have an ongoing health condition, like diabetes or high blood pressure, your doctor will probably want to see you more often.  What happens during a physical? -- In general, each visit will include:   Physical exam - The doctor or nurse will check your height, weight, heart rate, and blood pressure. They will also look at your eyes and ears. They will ask about how you are feeling and whether you have any symptoms that bother you.   Medicines - It's a good idea to bring a list of all the medicines you take to each doctor visit. Your doctor will talk to you about your medicines and answer any questions. Tell them if you are having any side effects that bother you. You " "should also tell them if you are having trouble paying for any of your medicines.   Habits and behaviors - This includes:   Your diet   Your exercise habits   Whether you smoke, drink alcohol, or use drugs   Whether you are sexually active   Whether you feel safe at home  Your doctor will talk to you about things you can do to improve your health and lower your risk of health problems. They will also offer help and support. For example, if you want to quit smoking, they can give you advice and might prescribe medicines. If you want to improve your diet or get more physical activity, they can help you with this, too.   Lab tests, if needed - The tests you get will depend on your age and situation. For example, your doctor might want to check your:   Cholesterol   Blood sugar   Iron level   Vaccines - The recommended vaccines will depend on your age, health, and what vaccines you already had. Vaccines are very important because they can prevent certain serious or deadly infections.   Discussion of screening - \"Screening\" means checking for diseases or other health problems before they cause symptoms. Your doctor can recommend screening based on your age, risk, and preferences. This might include tests to check for:   Cancer, such as breast, prostate, cervical, ovarian, colorectal, prostate, lung, or skin cancer   Sexually transmitted infections, such as chlamydia and gonorrhea   Mental health conditions like depression and anxiety  Your doctor will talk to you about the different types of screening tests. They can help you decide which screenings to have. They can also explain what the results might mean.   Answering questions - The physical is a good time to ask the doctor or nurse questions about your health. If needed, they can refer you to other doctors or specialists, too.  Adults older than 65 years often need other care, too. As you get older, your doctor will talk to you about:   How to prevent falling at " home   Hearing or vision tests   Memory testing   How to take your medicines safely   Making sure that you have the help and support you need at home  All topics are updated as new evidence becomes available and our peer review process is complete.  This topic retrieved from [employer] on: May 02, 2024.  Topic 113340 Version 1.0  Release: 32.4.3 - C32.122  © 2024 UpToDate, Inc. and/or its affiliates. All rights reserved.  Consumer Information Use and Disclaimer   Disclaimer: This generalized information is a limited summary of diagnosis, treatment, and/or medication information. It is not meant to be comprehensive and should be used as a tool to help the user understand and/or assess potential diagnostic and treatment options. It does NOT include all information about conditions, treatments, medications, side effects, or risks that may apply to a specific patient. It is not intended to be medical advice or a substitute for the medical advice, diagnosis, or treatment of a health care provider based on the health care provider's examination and assessment of a patient's specific and unique circumstances. Patients must speak with a health care provider for complete information about their health, medical questions, and treatment options, including any risks or benefits regarding use of medications. This information does not endorse any treatments or medications as safe, effective, or approved for treating a specific patient. UpToDate, Inc. and its affiliates disclaim any warranty or liability relating to this information or the use thereof.The use of this information is governed by the Terms of Use, available at https://www.[employer].com/en/know/clinical-effectiveness-terms. 2024© UpToDate, Inc. and its affiliates and/or licensors. All rights reserved.  Copyright   © 2024 UpToDate, Inc. and/or its affiliates. All rights reserved.

## 2025-03-03 NOTE — PROGRESS NOTES
Name: Beata Santillan      : 1980      MRN: [id number]  Encounter Provider: LUIZ Mcpherson  Encounter Date: 3/4/2025   Encounter department: [facility]  :  Assessment & Plan  Well woman exam with routine gynecological exam   All questions answered.  Await pap smear results.  Breast self exam technique reviewed and patient encouraged to perform self-exam monthly.  Contraception: tubal ligation.  Diagnosis explained in detail, including differential.  Dietary diary.  Discussed healthy lifestyle modifications.  Educational material distributed.  Follow up in 1 year.  Follow up as needed.  Thin prep Pap smear.  Breast awareness reviewed    Encouraged healthy diet, exercise and lifestyle  Encouraged follow-up with PCP as needed  Orders:    Liquid-based pap, screening    Extremely dense tissue of both breasts on mammography  Reviewed recommendation for ABUS  Orders:    Mammo screening bilateral w 3d and cad; Future    US breast screening bilateral complete (ABUS); Future    Breast cancer screening by mammogram    Orders:    Mammo screening bilateral w 3d and cad; Future    Encounter for breast cancer screening using non-mammogram modality    Orders:    US breast screening bilateral complete (ABUS); Future      Will call/ mySkin message with results  VBI-    BMI Counseling: Body mass index is 27.42 kg/m². The BMI is above normal. Nutrition recommendations include 3-5 servings of fruits/vegetables daily. Exercise recommendations include exercising 3-5 times per week.    RTO-1 year for annual exam  History of Present Illness   HPI  Beata Santillan is a 44 y.o. female who presents    Beata Santillan is a 44 y.o. female who presents for annual well woman exam.  Last Pap smear 1/15/24 NILM/ HR HPV positive non 16/18.  Last mammogram 24 extremely dense breast tissue/BI-RADS 1.  CRC screening Cologuard negative 24.  Periods are amenorrhea  s/p endometrial ablation .  "No intermenstrual bleeding, spotting, or discharge.    Current contraception: tubal ligation  History of abnormal Pap smear: yes  Family history of uterine or ovarian cancer: unsure   Regular self breast exam: no  History of abnormal mammogram: no  Family history of breast cancer: yes - paternal aunt   History of abnormal lipids: no    History obtained from: patient    Review of Systems   Constitutional:  Negative for chills and fever.   Respiratory: Negative.     Cardiovascular: Negative.    Genitourinary: Negative.      Medical History Reviewed by provider this encounter:  Tobacco  Allergies  Meds  Problems  Med Hx  Surg Hx  Fam Hx  Soc   Hx    .     Objective   /92   Ht 5' 5\" (1.651 m)   Wt 74.8 kg (164 lb 12.8 oz)   BMI 27.42 kg/m²      Physical Exam  General:   alert and oriented, in no acute distress, alert, appears stated age, and cooperative   Heart: regular rate and rhythm, S1, S2 normal, no murmur, click, rub or gallop   Lungs: clear to auscultation bilaterally   Abdomen: soft, non-tender, without masses or organomegaly   Vulva: normal, Bartholin's, Urethra, [place]'s normal   Vagina: normal mucosa, normal discharge, no palpable nodules   Cervix: no bleeding following Pap, no cervical motion tenderness, and no lesions   Uterus: normal size, non-tender, normal shape and consistency   Adnexa: normal adnexa and no mass, fullness, tenderness   Breast: breasts appear normal, no suspicious masses, no skin or nipple changes or axillary nodes.      Menstrual History:  OB History          5    Para   3    Term   3            AB   2    Living   3         SAB   1    IAB   1    Ectopic        Multiple        Live Births   3           Obstetric Comments   Menarche age 9               Menarche age: 9  No LMP recorded. Patient has had an ablation.  Period Cycle (Days):  (endometrial ablation mid-20s)  "

## 2025-03-04 ENCOUNTER — ANNUAL EXAM (OUTPATIENT)
Dept: OBGYN CLINIC | Facility: CLINIC | Age: 45
End: 2025-03-04
Payer: COMMERCIAL

## 2025-03-04 VITALS
HEIGHT: 65 IN | SYSTOLIC BLOOD PRESSURE: 122 MMHG | WEIGHT: 164.8 LBS | BODY MASS INDEX: 27.46 KG/M2 | DIASTOLIC BLOOD PRESSURE: 92 MMHG

## 2025-03-04 DIAGNOSIS — R92.343 EXTREMELY DENSE TISSUE OF BOTH BREASTS ON MAMMOGRAPHY: ICD-10-CM

## 2025-03-04 DIAGNOSIS — Z01.419 WELL WOMAN EXAM WITH ROUTINE GYNECOLOGICAL EXAM: Primary | ICD-10-CM

## 2025-03-04 DIAGNOSIS — Z12.39 ENCOUNTER FOR BREAST CANCER SCREENING USING NON-MAMMOGRAM MODALITY: ICD-10-CM

## 2025-03-04 DIAGNOSIS — Z12.31 BREAST CANCER SCREENING BY MAMMOGRAM: ICD-10-CM

## 2025-03-04 PROCEDURE — G0145 SCR C/V CYTO,THINLAYER,RESCR: HCPCS | Performed by: PATHOLOGY

## 2025-03-04 PROCEDURE — G0476 HPV COMBO ASSAY CA SCREEN: HCPCS | Performed by: NURSE PRACTITIONER

## 2025-03-04 PROCEDURE — S0612 ANNUAL GYNECOLOGICAL EXAMINA: HCPCS | Performed by: NURSE PRACTITIONER

## 2025-03-04 NOTE — ASSESSMENT & PLAN NOTE
Reviewed recommendation for ABUS  Orders:    Mammo screening bilateral w 3d and cad; Future    US breast screening bilateral complete (ABUS); Future

## 2025-03-12 LAB
LAB AP GYN PRIMARY INTERPRETATION: ABNORMAL
Lab: ABNORMAL
PATH INTERP SPEC-IMP: ABNORMAL

## 2025-03-12 PROCEDURE — G0124 SCREEN C/V THIN LAYER BY MD: HCPCS | Performed by: PATHOLOGY

## 2025-03-13 ENCOUNTER — RESULTS FOLLOW-UP (OUTPATIENT)
Dept: OBGYN CLINIC | Facility: MEDICAL CENTER | Age: 45
End: 2025-03-13

## 2025-03-13 NOTE — TELEPHONE ENCOUNTER
Patient called back, reviewed providers recommendations and colop procedure.   Appt scheduled.  Patient verbalized understanding.

## 2025-05-12 NOTE — PROGRESS NOTES
A/P     PAP - 3/4/25- LGSIL + other HPV also positive - 1/15/24    For colpo       Colposcopy    Date/Time: 5/15/2025 9:00 AM    Performed by: Dk Garcia MD  Authorized by: Dk Garcia MD    Other Assisting Provider: No    Verbal consent obtained?: Yes    Written consent obtained?: Yes    Risks and benefits: Risks, benefits and alternatives were discussed    Consent given by:  Patient  Time Out:     Time out: Immediately prior to the procedure a time out was called    Patient states understanding of procedure being performed: Yes    Patient's understanding of procedure matches consent: Yes    Procedure consent matches procedure scheduled: Yes    Relevant documents present and verified: Yes    Test results available and properly labeled: Yes    Required items: Required blood products, implants, devices and special equipment available    Patient identity confirmed:  Verbally with patient  Pre-procedure:     Negative urine pregnancy test: yes      Prepped with: acetic acid    Indication:     Indication:  LSIL  Procedure:     Procedure: Colposcopy w/ cervical biopsy and ECC      Under satisfactory analgesia the patient was prepped and draped in the dorsal lithotomy position: yes      [place] speculum was placed in the vagina: yes      Cervix was cleansed with betadine: yes      Under colposcopic examination the transition zone was seen in entirety: yes      Intracervical block was performed: no      Endocervix was curetted using a Kevorkian curette: yes      Cervical biopsy performed with a cervical biopsy punch: yes      Monsel's solution was applied: no      Allis/Tenaculum removed and cervix homostatic: no      Specimen(s) to pathology: yes    Post-procedure:     Patient tolerance of procedure:  Tolerated well, no immediate complications

## 2025-05-15 ENCOUNTER — PROCEDURE VISIT (OUTPATIENT)
Dept: OBGYN CLINIC | Facility: MEDICAL CENTER | Age: 45
End: 2025-05-15
Payer: COMMERCIAL

## 2025-05-15 VITALS
HEIGHT: 65 IN | SYSTOLIC BLOOD PRESSURE: 120 MMHG | WEIGHT: 167 LBS | BODY MASS INDEX: 27.82 KG/M2 | DIASTOLIC BLOOD PRESSURE: 80 MMHG

## 2025-05-15 DIAGNOSIS — B97.7 HPV IN FEMALE: ICD-10-CM

## 2025-05-15 DIAGNOSIS — R87.612 LGSIL ON PAP SMEAR OF CERVIX: Primary | ICD-10-CM

## 2025-05-15 PROCEDURE — 88344 IMHCHEM/IMCYTCHM EA MLT ANTB: CPT | Performed by: PATHOLOGY

## 2025-05-15 PROCEDURE — 88305 TISSUE EXAM BY PATHOLOGIST: CPT | Performed by: PATHOLOGY

## 2025-05-15 PROCEDURE — 57454 BX/CURETT OF CERVIX W/SCOPE: CPT | Performed by: OBSTETRICS & GYNECOLOGY

## 2025-05-20 PROCEDURE — 88305 TISSUE EXAM BY PATHOLOGIST: CPT | Performed by: PATHOLOGY

## 2025-05-20 PROCEDURE — 88344 IMHCHEM/IMCYTCHM EA MLT ANTB: CPT | Performed by: PATHOLOGY

## 2025-05-23 ENCOUNTER — RESULTS FOLLOW-UP (OUTPATIENT)
Dept: OBGYN CLINIC | Facility: MEDICAL CENTER | Age: 45
End: 2025-05-23

## (undated) DEVICE — SUT PROLENE 4-0 RB1 36 IN 8357H

## (undated) DEVICE — BASIC DOUBLE BASIN 2-LF: Brand: MEDLINE INDUSTRIES, INC.

## (undated) DEVICE — FABRIC REINFORCED, SURGICAL GOWN, XL: Brand: CONVERTORS

## (undated) DEVICE — GLOVE INDICATOR PI UNDERGLOVE SZ 7.5 BLUE

## (undated) DEVICE — PLUMEPEN PRO 10FT

## (undated) DEVICE — ANTIBACTERIAL UNDYED BRAIDED (POLYGLACTIN 910), SYNTHETIC ABSORBABLE SUTURE: Brand: COATED VICRYL

## (undated) DEVICE — INTENDED FOR TISSUE SEPARATION, AND OTHER PROCEDURES THAT REQUIRE A SHARP SURGICAL BLADE TO PUNCTURE OR CUT.: Brand: BARD-PARKER SAFETY BLADES SIZE 15, STERILE

## (undated) DEVICE — ADHESIVE SKIN HIGH VISCOSITY EXOFIN 1ML

## (undated) DEVICE — GLOVE SRG BIOGEL 7.5